# Patient Record
Sex: MALE | Race: OTHER | Employment: PART TIME | URBAN - METROPOLITAN AREA
[De-identification: names, ages, dates, MRNs, and addresses within clinical notes are randomized per-mention and may not be internally consistent; named-entity substitution may affect disease eponyms.]

---
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
## 2018-01-15 NOTE — TELEPHONE ENCOUNTER
Pt states lost printed script for this month for med below and he would like a replacement and states out of med and can he please contact Pt when ready for .      •  amphetamine-dextroamphetamine (ADDERALL) 20 MG Oral Tab, Take 20 mg by mouth 2 (two

## 2018-01-16 RX ORDER — DEXTROAMPHETAMINE SACCHARATE, AMPHETAMINE ASPARTATE, DEXTROAMPHETAMINE SULFATE AND AMPHETAMINE SULFATE 5; 5; 5; 5 MG/1; MG/1; MG/1; MG/1
20 TABLET ORAL 2 TIMES DAILY
Refills: 0 | Status: CANCELLED | OUTPATIENT
Start: 2018-01-16

## 2018-01-16 RX ORDER — DEXTROAMPHETAMINE SACCHARATE, AMPHETAMINE ASPARTATE, DEXTROAMPHETAMINE SULFATE AND AMPHETAMINE SULFATE 5; 5; 5; 5 MG/1; MG/1; MG/1; MG/1
20 TABLET ORAL 2 TIMES DAILY
Qty: 60 TABLET | Refills: 0 | Status: SHIPPED | OUTPATIENT
Start: 2018-01-16 | End: 2018-02-13

## 2018-01-16 NOTE — TELEPHONE ENCOUNTER
Message noted: Chart reviewed and may refill medication as requested times one. Script printed and left at  here at [facility]. Please notify patient.

## 2018-02-13 RX ORDER — DEXTROAMPHETAMINE SACCHARATE, AMPHETAMINE ASPARTATE, DEXTROAMPHETAMINE SULFATE AND AMPHETAMINE SULFATE 5; 5; 5; 5 MG/1; MG/1; MG/1; MG/1
20 TABLET ORAL 2 TIMES DAILY
Qty: 60 TABLET | Refills: 0 | Status: SHIPPED | OUTPATIENT
Start: 2018-02-13 | End: 2018-03-13

## 2018-02-13 NOTE — TELEPHONE ENCOUNTER
Pt calling states he has spoken to his pharmacy and they refuse to send the request for his refill, pt states they state he must call the office. Pt requesting refill for adderall.        Current Outpatient Prescriptions:  amphetamine-dextroamphetamine (ADD

## 2018-02-16 ENCOUNTER — OFFICE VISIT (OUTPATIENT)
Dept: INTERNAL MEDICINE CLINIC | Facility: CLINIC | Age: 18
End: 2018-02-16

## 2018-02-16 ENCOUNTER — TELEPHONE (OUTPATIENT)
Dept: INTERNAL MEDICINE CLINIC | Facility: CLINIC | Age: 18
End: 2018-02-16

## 2018-02-16 VITALS
BODY MASS INDEX: 25.91 KG/M2 | HEIGHT: 70 IN | WEIGHT: 181 LBS | SYSTOLIC BLOOD PRESSURE: 127 MMHG | DIASTOLIC BLOOD PRESSURE: 75 MMHG | TEMPERATURE: 98 F | HEART RATE: 71 BPM

## 2018-02-16 DIAGNOSIS — R30.9 PAIN WITH URINATION: Primary | ICD-10-CM

## 2018-02-16 DIAGNOSIS — R30.0 DYSURIA: ICD-10-CM

## 2018-02-16 LAB
APPEARANCE: CLEAR
BILIRUBIN: NEGATIVE
GLUCOSE (URINE DIPSTICK): NEGATIVE MG/DL
KETONES (URINE DIPSTICK): NEGATIVE MG/DL
MULTISTIX LOT#: NORMAL NUMERIC
NITRITE, URINE: NEGATIVE
OCCULT BLOOD: NEGATIVE
PH, URINE: 6 (ref 4.5–8)
PROTEIN (URINE DIPSTICK): 6 MG/DL
SPECIFIC GRAVITY: 1 (ref 1–1.03)
URINE-COLOR: YELLOW
UROBILINOGEN,SEMI-QN: NEGATIVE MG/DL (ref 0–1.9)

## 2018-02-16 PROCEDURE — 99212 OFFICE O/P EST SF 10 MIN: CPT | Performed by: INTERNAL MEDICINE

## 2018-02-16 PROCEDURE — 99203 OFFICE O/P NEW LOW 30 MIN: CPT | Performed by: INTERNAL MEDICINE

## 2018-02-16 PROCEDURE — 81002 URINALYSIS NONAUTO W/O SCOPE: CPT | Performed by: INTERNAL MEDICINE

## 2018-02-16 NOTE — PROGRESS NOTES
Patient ID: Shahrzad Olmos is a 21year old male. Patient presents with:  Urinary Symptoms (urologic): x4 days wonders if it is related to Chronic Granulomatous Disease. [facility] Diagnosed him, the ID department.        HISTORY OF EN , Rfl:   •  itraconazole (SPORANOX) 100 MG Oral Cap, Take  by mouth., Disp: , Rfl:   •  amphetamine-dextroamphetamine (ADDERALL) 20 MG Oral Tab, Take 1 tablet (20 mg total) by mouth 2 (two) times daily. , Disp: 60 tablet, Rfl: 0  •  mercaptopurine 50 MG Ora negative   BILIRUBIN Latest Ref Range: Negative  negative   KETONES (URINE DIPSTICK) Latest Ref Range: Negative mg/dL negative   OCCULT BLOOD Latest Ref Range: Negative  negative   PH, URINE Latest Ref Range: 4.5 - 8.0  6.0   PROTEIN (URINE DIPSTICK) Lates

## 2018-02-16 NOTE — TELEPHONE ENCOUNTER
Richard Barnett from [facility] called in as an FYI to let Dr. Bonifacio Odell know that they will be prescribing him a steroid taper and just wanted to make sure Dr. Bonifacio Odell didn't prescribe anything. Richard Barnett states pt will follow up with them next week as well.

## 2018-02-16 NOTE — PATIENT INSTRUCTIONS
Dysuria     Painful urination (dysuria) is often caused by a problem in the urinary tract. Dysuria is pain felt during urination. It is often described as a burning. Learn more about this problem and how it can be treated. What causes dysuria?   Possib · Rash or joint pain  · Increased back or abdominal pain  · Enlarged painful lymph nodes (lumps) in the groin   Date Last Reviewed: 1/1/2017  © 8930-8899 [employer] [address].  [address] 1  n

## 2018-03-07 ENCOUNTER — LAB ENCOUNTER (OUTPATIENT)
Dept: LAB | Age: 18
End: 2018-03-07
Attending: INTERNAL MEDICINE
Payer: COMMERCIAL

## 2018-03-07 ENCOUNTER — NURSE TRIAGE (OUTPATIENT)
Dept: INTERNAL MEDICINE CLINIC | Facility: CLINIC | Age: 18
End: 2018-03-07

## 2018-03-07 ENCOUNTER — OFFICE VISIT (OUTPATIENT)
Dept: INTERNAL MEDICINE CLINIC | Facility: CLINIC | Age: 18
End: 2018-03-07

## 2018-03-07 VITALS
HEIGHT: 70 IN | WEIGHT: 178 LBS | DIASTOLIC BLOOD PRESSURE: 72 MMHG | BODY MASS INDEX: 25.48 KG/M2 | SYSTOLIC BLOOD PRESSURE: 124 MMHG | HEART RATE: 79 BPM

## 2018-03-07 DIAGNOSIS — N50.82 SCROTAL PAIN: ICD-10-CM

## 2018-03-07 DIAGNOSIS — N50.82 SCROTAL PAIN: Primary | ICD-10-CM

## 2018-03-07 LAB
BASOPHILS # BLD: 0.1 K/UL (ref 0–0.2)
BASOPHILS NFR BLD: 1 %
BILIRUB UR QL: NEGATIVE
CLARITY UR: CLEAR
COLOR UR: YELLOW
EOSINOPHIL # BLD: 0.2 K/UL (ref 0–0.7)
EOSINOPHIL NFR BLD: 4 %
ERYTHROCYTE [DISTWIDTH] IN BLOOD BY AUTOMATED COUNT: 14.1 % (ref 11–15)
GLUCOSE UR-MCNC: NEGATIVE MG/DL
HCT VFR BLD AUTO: 40.7 % (ref 41–52)
HGB BLD-MCNC: 13.9 G/DL (ref 13.5–17.5)
HGB UR QL STRIP.AUTO: NEGATIVE
KETONES UR-MCNC: NEGATIVE MG/DL
LEUKOCYTE ESTERASE UR QL STRIP.AUTO: NEGATIVE
LYMPHOCYTES # BLD: 1.5 K/UL (ref 1–4)
LYMPHOCYTES NFR BLD: 26 %
MCH RBC QN AUTO: 29.5 PG (ref 27–32)
MCHC RBC AUTO-ENTMCNC: 34 G/DL (ref 32–37)
MCV RBC AUTO: 86.6 FL (ref 80–100)
MONOCYTES # BLD: 0.7 K/UL (ref 0–1)
MONOCYTES NFR BLD: 13 %
NEUTROPHILS # BLD AUTO: 3.2 K/UL (ref 1.8–7.7)
NEUTROPHILS NFR BLD: 56 %
NITRITE UR QL STRIP.AUTO: NEGATIVE
PH UR: 6 [PH] (ref 5–8)
PLATELET # BLD AUTO: 201 K/UL (ref 140–400)
PMV BLD AUTO: 8.6 FL (ref 7.4–10.3)
PROT UR-MCNC: NEGATIVE MG/DL
RBC # BLD AUTO: 4.7 M/UL (ref 4.5–5.9)
SP GR UR STRIP: 1.02 (ref 1–1.03)
UROBILINOGEN UR STRIP-ACNC: <2
VIT C UR-MCNC: NEGATIVE MG/DL
WBC # BLD AUTO: 5.7 K/UL (ref 4–11)

## 2018-03-07 PROCEDURE — 86694 HERPES SIMPLEX NES ANTBDY: CPT

## 2018-03-07 PROCEDURE — 87340 HEPATITIS B SURFACE AG IA: CPT

## 2018-03-07 PROCEDURE — 99213 OFFICE O/P EST LOW 20 MIN: CPT | Performed by: INTERNAL MEDICINE

## 2018-03-07 PROCEDURE — 36415 COLL VENOUS BLD VENIPUNCTURE: CPT

## 2018-03-07 PROCEDURE — 86780 TREPONEMA PALLIDUM: CPT

## 2018-03-07 PROCEDURE — 87591 N.GONORRHOEAE DNA AMP PROB: CPT

## 2018-03-07 PROCEDURE — 99212 OFFICE O/P EST SF 10 MIN: CPT | Performed by: INTERNAL MEDICINE

## 2018-03-07 PROCEDURE — 81003 URINALYSIS AUTO W/O SCOPE: CPT

## 2018-03-07 PROCEDURE — 87389 HIV-1 AG W/HIV-1&-2 AB AG IA: CPT

## 2018-03-07 PROCEDURE — 85025 COMPLETE CBC W/AUTO DIFF WBC: CPT

## 2018-03-07 PROCEDURE — 87491 CHLMYD TRACH DNA AMP PROBE: CPT

## 2018-03-07 PROCEDURE — 86803 HEPATITIS C AB TEST: CPT

## 2018-03-07 RX ORDER — TRETINOIN 0.025 %
CREAM (GRAM) TOPICAL
COMMUNITY
Start: 2016-07-25 | End: 2019-09-11

## 2018-03-07 RX ORDER — CLINDAMYCIN PHOSPHATE 10 MG/ML
LOTION TOPICAL
COMMUNITY
Start: 2016-07-25 | End: 2018-03-07

## 2018-03-07 RX ORDER — SULFAMETHOXAZOLE AND TRIMETHOPRIM 400; 80 MG/1; MG/1
TABLET ORAL
COMMUNITY
Start: 2016-07-25 | End: 2018-03-07

## 2018-03-07 NOTE — TELEPHONE ENCOUNTER
Action Requested: Summary for Provider     []  Critical Lab, Recommendations Needed  [] Need Additional Advice  []   FYI    []   Need Orders  [] Need Medications Sent to Pharmacy  []  Other     SUMMARY: Scheduled today with MA at [facility] at 1:40 pm, per pt pre

## 2018-03-07 NOTE — PROGRESS NOTES
Xavi Roger is a 21year old male.   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

## 2018-03-08 LAB
C TRACH DNA SPEC QL NAA+PROBE: NEGATIVE
HBV SURFACE AG SERPL QL IA: NONREACTIVE
HCV AB SERPL QL IA: NONREACTIVE
HIV1+2 AB SERPL QL IA: NONREACTIVE
N GONORRHOEA DNA SPEC QL NAA+PROBE: NEGATIVE

## 2018-03-09 LAB — T PALLIDUM AB SER QL: NEGATIVE

## 2018-03-10 LAB
HSV TYPE 1/2 COMBINED AB, IGG: 1 IV
HSV TYPE 1/2 COMBINED ABS, IGM: 0.36 IV

## 2018-03-13 RX ORDER — DEXTROAMPHETAMINE SACCHARATE, AMPHETAMINE ASPARTATE, DEXTROAMPHETAMINE SULFATE AND AMPHETAMINE SULFATE 5; 5; 5; 5 MG/1; MG/1; MG/1; MG/1
20 TABLET ORAL 2 TIMES DAILY
Qty: 60 TABLET | Refills: 0 | Status: SHIPPED | OUTPATIENT
Start: 2018-03-16 | End: 2018-04-15

## 2018-03-13 RX ORDER — DEXTROAMPHETAMINE SACCHARATE, AMPHETAMINE ASPARTATE, DEXTROAMPHETAMINE SULFATE AND AMPHETAMINE SULFATE 5; 5; 5; 5 MG/1; MG/1; MG/1; MG/1
20 TABLET ORAL 2 TIMES DAILY
Qty: 60 TABLET | Refills: 0 | Status: SHIPPED | OUTPATIENT
Start: 2018-05-17 | End: 2018-06-18

## 2018-03-13 RX ORDER — DEXTROAMPHETAMINE SACCHARATE, AMPHETAMINE ASPARTATE, DEXTROAMPHETAMINE SULFATE AND AMPHETAMINE SULFATE 5; 5; 5; 5 MG/1; MG/1; MG/1; MG/1
20 TABLET ORAL 2 TIMES DAILY
Qty: 60 TABLET | Refills: 0 | Status: SHIPPED | OUTPATIENT
Start: 2018-04-16 | End: 2018-05-16

## 2018-03-13 NOTE — TELEPHONE ENCOUNTER
Message noted: Chart reviewed and may refill medication as requested times 3. Script printed and left at  here at [facility]. Please notify patient.

## 2018-03-13 NOTE — TELEPHONE ENCOUNTER
Pt stts he would like a refill on  Rx Adderall 20 MG. Pt stts he is out of medication.  Pt stts his pharmacy is ok with it being faxed over to [facility].             Current Outpatient Prescriptions:  amphetamine-dextroamphetamine (ADDERALL) 20 MG Oral Tab Take

## 2018-05-16 RX ORDER — DEXTROAMPHETAMINE SACCHARATE, AMPHETAMINE ASPARTATE, DEXTROAMPHETAMINE SULFATE AND AMPHETAMINE SULFATE 5; 5; 5; 5 MG/1; MG/1; MG/1; MG/1
20 TABLET ORAL 2 TIMES DAILY
Qty: 60 TABLET | Refills: 0 | OUTPATIENT
Start: 2018-05-16 | End: 2018-06-15

## 2018-05-16 NOTE — TELEPHONE ENCOUNTER
Pt is requesting refill       Please advise       Current Outpatient Prescriptions:  [START ON 5/17/2018] amphetamine-dextroamphetamine (ADDERALL) 20 MG Oral Tab Take 1 tablet (20 mg total) by mouth 2 (two) times daily.  Disp: 60 tablet Rfl: 0

## 2018-05-18 NOTE — TELEPHONE ENCOUNTER
Spoke with patient and states he needs Adderall script--in the middle of finals and needs it to study.     Relayed to patient he should have script already given to him for this month--patient verbalizes understanding and agreement and will look for script

## 2018-06-18 RX ORDER — DEXTROAMPHETAMINE SACCHARATE, AMPHETAMINE ASPARTATE, DEXTROAMPHETAMINE SULFATE AND AMPHETAMINE SULFATE 5; 5; 5; 5 MG/1; MG/1; MG/1; MG/1
20 TABLET ORAL 2 TIMES DAILY
Qty: 60 TABLET | Refills: 0 | Status: SHIPPED | OUTPATIENT
Start: 2018-06-18 | End: 2018-10-30

## 2018-06-18 NOTE — TELEPHONE ENCOUNTER
Please advise.    Last refilled on 5/17/18    Please respond to pool: EM [facility] LPN/CMA    Refill Protocol Appointment Criteria  · Appointment scheduled in the past 6 months or in the next 3 months  Recent Outpatient Visits            3 months ago Scrotal p

## 2018-06-18 NOTE — TELEPHONE ENCOUNTER
Pt is requesting a 3 month supply. amphetamine-dextroamphetamine (ADDERALL) 20 MG Oral Tab Take 1 tablet (20 mg total) by mouth 2 (two) times daily.  Disp: 60 tablet Rfl: 0

## 2018-06-19 NOTE — TELEPHONE ENCOUNTER
Patient calling to check status of request and verify if this has been printed.      Patient requesting to  medication before 4 pm. Today

## 2018-06-24 ENCOUNTER — APPOINTMENT (OUTPATIENT)
Dept: GENERAL RADIOLOGY | Facility: HOSPITAL | Age: 18
End: 2018-06-24
Attending: EMERGENCY MEDICINE
Payer: COMMERCIAL

## 2018-06-24 ENCOUNTER — HOSPITAL ENCOUNTER (EMERGENCY)
Facility: HOSPITAL | Age: 18
Discharge: HOME | End: 2018-06-24
Attending: EMERGENCY MEDICINE
Payer: COMMERCIAL

## 2018-06-24 VITALS
DIASTOLIC BLOOD PRESSURE: 79 MMHG | TEMPERATURE: 98 F | WEIGHT: 170 LBS | HEART RATE: 72 BPM | BODY MASS INDEX: 24.34 KG/M2 | HEIGHT: 70 IN | RESPIRATION RATE: 18 BRPM | OXYGEN SATURATION: 99 % | SYSTOLIC BLOOD PRESSURE: 118 MMHG

## 2018-06-24 DIAGNOSIS — S39.012A LUMBAR STRAIN, INITIAL ENCOUNTER: Primary | ICD-10-CM

## 2018-06-24 PROCEDURE — 72100 X-RAY EXAM L-S SPINE 2/3 VWS: CPT | Performed by: EMERGENCY MEDICINE

## 2018-06-24 PROCEDURE — 99283 EMERGENCY DEPT VISIT LOW MDM: CPT

## 2018-06-24 RX ORDER — METHYLPREDNISOLONE 4 MG/1
TABLET ORAL
Qty: 1 PACKAGE | Refills: 0 | Status: SHIPPED | OUTPATIENT
Start: 2018-06-24 | End: 2018-06-29

## 2018-06-24 RX ORDER — CYCLOBENZAPRINE HCL 10 MG
10 TABLET ORAL 3 TIMES DAILY PRN
Qty: 20 TABLET | Refills: 0 | Status: SHIPPED | OUTPATIENT
Start: 2018-06-24 | End: 2018-07-01

## 2018-06-24 NOTE — ED INITIAL ASSESSMENT (HPI)
Pt reports lower back pain while lifting weights yesterday at noon. Pt reports that pain worsens with exertion and flexion of his neck.  Pt reports that pain is worse on the left lower lumbar region

## 2018-06-24 NOTE — ED PROVIDER NOTES
Patient Seen in: [facility] Emergency Department    History   Patient presents with:  Back Pain (musculoskeletal)    Stated Complaint: back pain from lifting weights     HPI    Patient is a 49-year-old male who was lifting weights at the gym yeste Lumbar back: He exhibits decreased range of motion and spasm (Lumbar paraspinal). He exhibits no tenderness. Back:    Neurological: He is alert and oriented to person, place, and time. He has normal strength and normal reflexes.  No sensory defi

## 2018-06-27 ENCOUNTER — OFFICE VISIT (OUTPATIENT)
Dept: INTERNAL MEDICINE CLINIC | Facility: CLINIC | Age: 18
End: 2018-06-27

## 2018-06-27 VITALS
SYSTOLIC BLOOD PRESSURE: 138 MMHG | BODY MASS INDEX: 24.48 KG/M2 | WEIGHT: 171 LBS | HEART RATE: 83 BPM | HEIGHT: 70 IN | DIASTOLIC BLOOD PRESSURE: 83 MMHG

## 2018-06-27 DIAGNOSIS — S39.012D STRAIN OF LUMBAR REGION, SUBSEQUENT ENCOUNTER: Primary | ICD-10-CM

## 2018-06-27 PROCEDURE — 99212 OFFICE O/P EST SF 10 MIN: CPT | Performed by: INTERNAL MEDICINE

## 2018-06-27 PROCEDURE — 99213 OFFICE O/P EST LOW 20 MIN: CPT | Performed by: INTERNAL MEDICINE

## 2018-06-27 RX ORDER — TRAMADOL HYDROCHLORIDE 50 MG/1
50 TABLET ORAL DAILY PRN
Qty: 10 TABLET | Refills: 0 | Status: SHIPPED | OUTPATIENT
Start: 2018-06-27 | End: 2018-07-05

## 2018-06-27 NOTE — PROGRESS NOTES
Bong Lee is a 21year old male.   Patient presents with:  ER F/U: lumbar strain      HPI:   Pt comes as an urgent visit   C/c back pain   C/o lumbar stain   Woke up with lower back pain 7/10 and the neck day 9/10 and had to go to ER --was given cyclobe No fevers, chills, sweats, fatigue  SKIN: denies any unusual skin lesions or rashes  GI: denies abdominal pain and denies heartburn, nausea or vomiting  : No Pain on urination, change in the color of urine, discharge, urinating frequently, urgency or hes

## 2018-06-30 ENCOUNTER — APPOINTMENT (OUTPATIENT)
Dept: CT IMAGING | Facility: HOSPITAL | Age: 18
End: 2018-06-30
Attending: EMERGENCY MEDICINE
Payer: COMMERCIAL

## 2018-06-30 ENCOUNTER — HOSPITAL ENCOUNTER (EMERGENCY)
Facility: HOSPITAL | Age: 18
Discharge: HOME | End: 2018-06-30
Attending: EMERGENCY MEDICINE
Payer: COMMERCIAL

## 2018-06-30 VITALS
HEIGHT: 70 IN | HEART RATE: 89 BPM | BODY MASS INDEX: 24.34 KG/M2 | RESPIRATION RATE: 16 BRPM | SYSTOLIC BLOOD PRESSURE: 118 MMHG | DIASTOLIC BLOOD PRESSURE: 98 MMHG | TEMPERATURE: 99 F | OXYGEN SATURATION: 98 % | WEIGHT: 170 LBS

## 2018-06-30 DIAGNOSIS — G44.209 TENSION HEADACHE: ICD-10-CM

## 2018-06-30 DIAGNOSIS — M62.830 SPASM OF MUSCLE OF LOWER BACK: Primary | ICD-10-CM

## 2018-06-30 LAB
ANION GAP SERPL CALC-SCNC: 7 MMOL/L (ref 0–18)
BUN SERPL-MCNC: 14 MG/DL (ref 8–20)
BUN/CREAT SERPL: 15.6 (ref 10–20)
CALCIUM SERPL-MCNC: 9.4 MG/DL (ref 8.5–10.5)
CHLORIDE SERPL-SCNC: 101 MMOL/L (ref 95–110)
CO2 SERPL-SCNC: 28 MMOL/L (ref 22–32)
CREAT SERPL-MCNC: 0.9 MG/DL (ref 0.5–1.5)
GLUCOSE SERPL-MCNC: 106 MG/DL (ref 70–99)
MAGNESIUM SERPL-MCNC: 2 MG/DL (ref 1.8–2.5)
OSMOLALITY UR CALC.SUM OF ELEC: 283 MOSM/KG (ref 275–295)
POTASSIUM SERPL-SCNC: 4.6 MMOL/L (ref 3.3–5.1)
SODIUM SERPL-SCNC: 136 MMOL/L (ref 136–144)

## 2018-06-30 PROCEDURE — 96375 TX/PRO/DX INJ NEW DRUG ADDON: CPT

## 2018-06-30 PROCEDURE — 99284 EMERGENCY DEPT VISIT MOD MDM: CPT

## 2018-06-30 PROCEDURE — 83735 ASSAY OF MAGNESIUM: CPT | Performed by: EMERGENCY MEDICINE

## 2018-06-30 PROCEDURE — 80048 BASIC METABOLIC PNL TOTAL CA: CPT | Performed by: EMERGENCY MEDICINE

## 2018-06-30 PROCEDURE — 70450 CT HEAD/BRAIN W/O DYE: CPT | Performed by: EMERGENCY MEDICINE

## 2018-06-30 PROCEDURE — 96374 THER/PROPH/DIAG INJ IV PUSH: CPT

## 2018-06-30 PROCEDURE — 96361 HYDRATE IV INFUSION ADD-ON: CPT

## 2018-06-30 RX ORDER — IBUPROFEN 600 MG/1
600 TABLET ORAL EVERY 8 HOURS PRN
Qty: 30 TABLET | Refills: 0 | Status: SHIPPED | OUTPATIENT
Start: 2018-06-30 | End: 2018-07-07

## 2018-06-30 RX ORDER — LIDOCAINE 50 MG/G
2 PATCH TOPICAL EVERY 24 HOURS
Qty: 15 PATCH | Refills: 0 | Status: SHIPPED | OUTPATIENT
Start: 2018-06-30 | End: 2018-10-31

## 2018-06-30 RX ORDER — LIDOCAINE 50 MG/G
2 PATCH TOPICAL ONCE
Status: DISCONTINUED | OUTPATIENT
Start: 2018-06-30 | End: 2018-06-30

## 2018-06-30 RX ORDER — DEXAMETHASONE SODIUM PHOSPHATE 4 MG/ML
10 VIAL (ML) INJECTION ONCE
Status: COMPLETED | OUTPATIENT
Start: 2018-06-30 | End: 2018-06-30

## 2018-06-30 RX ORDER — ORPHENADRINE CITRATE 100 MG/1
100 TABLET, EXTENDED RELEASE ORAL 2 TIMES DAILY
Qty: 20 TABLET | Refills: 0 | Status: SHIPPED | OUTPATIENT
Start: 2018-06-30 | End: 2018-07-07

## 2018-06-30 RX ORDER — KETOROLAC TROMETHAMINE 30 MG/ML
15 INJECTION, SOLUTION INTRAMUSCULAR; INTRAVENOUS ONCE
Status: COMPLETED | OUTPATIENT
Start: 2018-06-30 | End: 2018-06-30

## 2018-06-30 RX ORDER — ORPHENADRINE CITRATE 30 MG/ML
30 INJECTION INTRAMUSCULAR; INTRAVENOUS EVERY 12 HOURS
Status: DISCONTINUED | OUTPATIENT
Start: 2018-06-30 | End: 2018-06-30

## 2018-06-30 NOTE — ED PROVIDER NOTES
Patient Seen in: [facility] Emergency Department    History   Patient presents with:  Back Pain (musculoskeletal)    Stated Complaint: back pain    HPI    59-year-old male presents for complaint of lower back pain and headache.   Patient reports th Genitourinary: Negative. Musculoskeletal: Positive for back pain. Skin: Negative. Neurological: Positive for headaches. All other systems reviewed and are negative. Positive for stated complaint: back pain  Other systems are as noted in HPI. Neurological: He is alert and oriented to person, place, and time. He has normal strength. No cranial nerve deficit or sensory deficit. Coordination and gait normal.   Reflex Scores:       Patellar reflexes are 2+ on the right side and 2+ on the left side. PROCEDURE: CT BRAIN OR HEAD (CPT=70450)  COMPARISON: None. INDICATIONS: Persistent headache. Recent treatment for low back strain including anti-inflammatory drugs, muscle relaxants and steroids.   TECHNIQUE: CT images were obtained without contrast [facility] Medications Prescribed:  Current Discharge Medication List    START taking these medications    Orphenadrine Citrate  MG Oral Tablet 12 Hr  Take 100 mg by mouth 2 (two) times daily.   Qty: 20 tablet Refills: 0    ibuprofen 600 MG Oral Tab  Take 1 tabl

## 2018-06-30 NOTE — ED NOTES
Pt lifted weights a week ago and pulled his back. Pt seen in ed last Sunday. Followed up with md on Tuesday. Finished steroid dose pack and is taking tramadol for pain. Finished flexeril.  Pt states when he gets spasms his head feels like there is a band ar

## 2018-07-05 ENCOUNTER — OFFICE VISIT (OUTPATIENT)
Dept: INTERNAL MEDICINE CLINIC | Facility: CLINIC | Age: 18
End: 2018-07-05

## 2018-07-05 VITALS
SYSTOLIC BLOOD PRESSURE: 125 MMHG | WEIGHT: 171 LBS | DIASTOLIC BLOOD PRESSURE: 80 MMHG | HEART RATE: 98 BPM | BODY MASS INDEX: 24.48 KG/M2 | HEIGHT: 70 IN

## 2018-07-05 DIAGNOSIS — S39.012D STRAIN OF LUMBAR REGION, SUBSEQUENT ENCOUNTER: Primary | ICD-10-CM

## 2018-07-05 PROCEDURE — 99213 OFFICE O/P EST LOW 20 MIN: CPT | Performed by: INTERNAL MEDICINE

## 2018-07-05 PROCEDURE — 99212 OFFICE O/P EST SF 10 MIN: CPT | Performed by: INTERNAL MEDICINE

## 2018-07-05 RX ORDER — TRAMADOL HYDROCHLORIDE 50 MG/1
50 TABLET ORAL 2 TIMES DAILY PRN
Qty: 20 TABLET | Refills: 0 | Status: SHIPPED | OUTPATIENT
Start: 2018-07-05 | End: 2018-10-31

## 2018-07-05 NOTE — PROGRESS NOTES
Aloma Patch is a 21year old male.   Patient presents with:  Back Pain: better, starting PT today      HPI:   Pt comes for f/u  C/c back pain   C/o back pain is better   In the am will put the lidoderm patch and then ibuprofen and will do fine but by 10- Disp:  Rfl: 2   itraconazole (SPORANOX) 100 MG Oral Cap Take  by mouth. Disp:  Rfl:    tretinoin 0.025 % External Cream Apply topically.  Disp:  Rfl:       Past Medical History:   Diagnosis Date   • Chronic granulomatous disease (CGD) of childhood ([facility] [address] patient indicates understanding of these issues and agrees to the plan. No Follow-up on file.

## 2018-07-05 NOTE — PATIENT INSTRUCTIONS
Understanding Lumbosacral Strain    Lumbosacral strain is a medical term for an injury that causes low back pain. The lumbosacral area (low back) is between the bottom of the ribcage and the top of the buttocks.  A strain is tearing of muscles and tendons · Injections of medicine. This may relieve symptoms. If these treatments do not relieve symptoms, your healthcare provider may order imaging tests to learn more about the problem. Sometimes you may need surgery.   Possible complications of lumbosacral stra

## 2018-07-10 ENCOUNTER — TELEPHONE (OUTPATIENT)
Dept: INTERNAL MEDICINE CLINIC | Facility: CLINIC | Age: 18
End: 2018-07-10

## 2018-07-10 NOTE — TELEPHONE ENCOUNTER
Pt was seen by  last week and pt wants to know if Dr can write him a note for him to return back to work 7/11      Please advise

## 2018-07-10 NOTE — TELEPHONE ENCOUNTER
This is fine–please give him a letter to return to work-- pls call him and make sure when he went back - today or yest -ty

## 2018-07-20 ENCOUNTER — TELEPHONE (OUTPATIENT)
Dept: ADMINISTRATIVE | Age: 18
End: 2018-07-20

## 2018-07-20 NOTE — TELEPHONE ENCOUNTER
[employer] Disability form received in [facility] via dept. Logged for processing.  NK

## 2018-07-23 ENCOUNTER — TELEPHONE (OUTPATIENT)
Dept: INTERNAL MEDICINE CLINIC | Facility: CLINIC | Age: 18
End: 2018-07-23

## 2018-07-23 NOTE — TELEPHONE ENCOUNTER
LR 6/18/18    LOV 7/05/18 with MA    90 day panel pended for Adderall 20 BID for script printed    Please respond to pool: EM [facility] LPN/CMA    Office staff, please call patient when script ready for .

## 2018-07-23 NOTE — TELEPHONE ENCOUNTER
Pt is requesting refill for controlled substance. Please advise      Current Outpatient Prescriptions:              amphetamine-dextroamphetamine (ADDERALL) 20 MG Oral Tab Take 1 tablet (20 mg total) by mouth 2 (two) times daily.  Disp: 60 tablet Rfl: 0

## 2018-07-23 NOTE — TELEPHONE ENCOUNTER
Pt would like to drop off disability forms today. Pt states that he needs those forms completed before Friday.  Please advise

## 2018-07-24 RX ORDER — DEXTROAMPHETAMINE SACCHARATE, AMPHETAMINE ASPARTATE, DEXTROAMPHETAMINE SULFATE AND AMPHETAMINE SULFATE 5; 5; 5; 5 MG/1; MG/1; MG/1; MG/1
20 TABLET ORAL 2 TIMES DAILY
Qty: 60 TABLET | Refills: 0 | Status: SHIPPED | OUTPATIENT
Start: 2018-09-21 | End: 2018-08-28

## 2018-07-24 RX ORDER — DEXTROAMPHETAMINE SACCHARATE, AMPHETAMINE ASPARTATE, DEXTROAMPHETAMINE SULFATE AND AMPHETAMINE SULFATE 5; 5; 5; 5 MG/1; MG/1; MG/1; MG/1
20 TABLET ORAL 2 TIMES DAILY
Qty: 60 TABLET | Refills: 0 | Status: SHIPPED | OUTPATIENT
Start: 2018-07-24 | End: 2018-08-23

## 2018-07-24 RX ORDER — DEXTROAMPHETAMINE SACCHARATE, AMPHETAMINE ASPARTATE, DEXTROAMPHETAMINE SULFATE AND AMPHETAMINE SULFATE 5; 5; 5; 5 MG/1; MG/1; MG/1; MG/1
20 TABLET ORAL 2 TIMES DAILY
Qty: 60 TABLET | Refills: 0 | Status: SHIPPED | OUTPATIENT
Start: 2018-08-22 | End: 2018-08-28

## 2018-07-24 NOTE — TELEPHONE ENCOUNTER
Dr. Renae Erps,    I have attached two forms that require sign off. One FMLA and other Disab. Dr. Masha Chavez saw him for back pain and supported time off. I am hoping you could sign off since he needs paperwork by Friday. Please sign off on form:  -Highlight the patient and hit \"Chart\" button. -In Chart Review, w/in the Encounter tab - click 1 time on the Telephone call encounter for 7/20/2018. Scroll down the telephone encounter.  -Click \"scan on\" blue Hyperlink under \"Media\" heading for PPD Disab and FMLA 7/20/2018 w/in the telephone enc.  -Click on Acknowledge button at the bottom right corner and left-click onto image, signature stamp appears and drag signature to Provider signature line. Stamp will turn blue. Close window.     Thank you,    Carmela Bender.

## 2018-07-24 NOTE — TELEPHONE ENCOUNTER
Dropped off by pt @[facility] - [employer] FMLA + Disability forms+ FCR+ Signed release, aware billed for $50 (2 forms). Logged for processing.  NK

## 2018-07-25 NOTE — TELEPHONE ENCOUNTER
Completed forms faxed to [facility] and originals mailed to patient.  SC

## 2018-08-28 ENCOUNTER — TELEPHONE (OUTPATIENT)
Dept: INTERNAL MEDICINE CLINIC | Facility: CLINIC | Age: 18
End: 2018-08-28

## 2018-08-28 RX ORDER — DEXTROAMPHETAMINE SACCHARATE, AMPHETAMINE ASPARTATE, DEXTROAMPHETAMINE SULFATE AND AMPHETAMINE SULFATE 5; 5; 5; 5 MG/1; MG/1; MG/1; MG/1
20 TABLET ORAL 2 TIMES DAILY
Qty: 60 TABLET | Refills: 0 | Status: SHIPPED | OUTPATIENT
Start: 2018-09-21 | End: 2018-10-21

## 2018-08-28 RX ORDER — DEXTROAMPHETAMINE SACCHARATE, AMPHETAMINE ASPARTATE, DEXTROAMPHETAMINE SULFATE AND AMPHETAMINE SULFATE 5; 5; 5; 5 MG/1; MG/1; MG/1; MG/1
20 TABLET ORAL 2 TIMES DAILY
Qty: 60 TABLET | Refills: 0 | Status: SHIPPED | OUTPATIENT
Start: 2018-08-28 | End: 2018-09-27

## 2018-08-28 NOTE — TELEPHONE ENCOUNTER
Pt called back, states that his dad picked up the prescriptions, he will call to ask him where he placed the other 2 scripts.

## 2018-08-28 NOTE — TELEPHONE ENCOUNTER
Pt needs a script for    Current Outpatient Prescriptions:  amphetamine-dextroamphetamine (ADDERALL) 20 MG Oral Tab Take 1 tablet (20 mg total) by mouth 2 (two) times daily.  Disp: 60 tablet Rfl: 0                                               Per pt he is

## 2018-08-28 NOTE — TELEPHONE ENCOUNTER
Pt called back. States that his father reports that he only received one rx. He did not receive three rxs. He only received one rx on 7/24 and now he is out of meds. Father never picked up the Aug or Sept rxs.   Pt is asking that these rxs be rewritten

## 2018-09-24 NOTE — TELEPHONE ENCOUNTER
Patient states his mom only picked up August Adderall Rx--patient asking if there is another Rx for September available for  at [facility] office?     Office staff, please contact patient re:September script

## 2018-10-29 RX ORDER — DEXTROAMPHETAMINE SACCHARATE, AMPHETAMINE ASPARTATE, DEXTROAMPHETAMINE SULFATE AND AMPHETAMINE SULFATE 5; 5; 5; 5 MG/1; MG/1; MG/1; MG/1
20 TABLET ORAL 2 TIMES DAILY
Qty: 60 TABLET | Refills: 0 | OUTPATIENT
Start: 2018-10-29

## 2018-10-29 NOTE — TELEPHONE ENCOUNTER
Controlled medication pending for review. If approved needs to be called in or faxed by on-site staff.     Last Rx: 6/18  LOV: 11/13/17

## 2018-10-29 NOTE — TELEPHONE ENCOUNTER
Pt called in stating that he needs a refill on medication below. Pt states that he is completely out of medication. Pt states that it is a little difficult calling every month to get refills, asking if there is any way to do multiple months.      Please adv

## 2018-10-29 NOTE — TELEPHONE ENCOUNTER
[facility]  We need to find out who he is seeing. It looks like Dr. Leonor Christie has been seeing him for various conditions. Last seen for ADD on 11/13/17 by Dr. Becky Prince.

## 2018-10-30 RX ORDER — DEXTROAMPHETAMINE SACCHARATE, AMPHETAMINE ASPARTATE, DEXTROAMPHETAMINE SULFATE AND AMPHETAMINE SULFATE 5; 5; 5; 5 MG/1; MG/1; MG/1; MG/1
20 TABLET ORAL 2 TIMES DAILY
Qty: 60 TABLET | Refills: 0 | Status: SHIPPED | OUTPATIENT
Start: 2018-10-30 | End: 2019-03-27

## 2018-10-30 NOTE — TELEPHONE ENCOUNTER
Pt states that he wants this refill request to be sent to Dr. Radha Nicholson as he saw her most recently. I advised that she hadn't seen her for the condition which requires this medication, but pt wanted it sent to her. Please advise.

## 2018-10-30 NOTE — TELEPHONE ENCOUNTER
Controlled medication pending for review. If approved needs to be called in or faxed by on-site staff. Last Rx: 06/18/18.   LOV: 07/05/18

## 2018-10-31 NOTE — TELEPHONE ENCOUNTER
Please respond to pool: EM DAVE Escamilla 60 LPN/CMA--please notify patient, have patient   Patient has appt 10/31/18 with Dr Collins Beltran, he can  at appt today  [address] [facility] [address] - [facility], [address]  [phone]

## 2018-10-31 NOTE — PROGRESS NOTES
Mily Hilliard is a 19year old male.   Patient presents with:  ADHD      HPI:   Pt comes for f/u  C/c adhd   C/op adhd and was diag by dr Coreen Fonseca when he was in the 7th grade -- and dr Tila Krueger his pediatrician refilled it then dr Mann Mccullough once and dr Rebel Montgomery abdominal pain nausea or vomiting  MUS: No back pain, joint pain, muscle pain  NEURO: denies headaches , anxiety, depression    EXAM:   /78 (BP Location: Right arm, Patient Position: Sitting, Cuff Size: adult)   Pulse 81   Ht 5' 10\" (1.778 m)   Wt 1

## 2018-11-16 ENCOUNTER — OFFICE VISIT (OUTPATIENT)
Dept: INTERNAL MEDICINE CLINIC | Facility: CLINIC | Age: 18
End: 2018-11-16
Payer: COMMERCIAL

## 2018-11-16 VITALS
HEART RATE: 82 BPM | DIASTOLIC BLOOD PRESSURE: 72 MMHG | BODY MASS INDEX: 26.34 KG/M2 | HEIGHT: 70 IN | WEIGHT: 184 LBS | SYSTOLIC BLOOD PRESSURE: 118 MMHG

## 2018-11-16 DIAGNOSIS — K29.40 ATROPHIC GASTRITIS WITHOUT HEMORRHAGE: Primary | ICD-10-CM

## 2018-11-16 PROCEDURE — 99213 OFFICE O/P EST LOW 20 MIN: CPT | Performed by: INTERNAL MEDICINE

## 2018-11-16 PROCEDURE — 99212 OFFICE O/P EST SF 10 MIN: CPT | Performed by: INTERNAL MEDICINE

## 2018-11-16 RX ORDER — PANTOPRAZOLE SODIUM 40 MG/1
40 TABLET, DELAYED RELEASE ORAL
Qty: 30 TABLET | Refills: 3 | Status: SHIPPED | OUTPATIENT
Start: 2018-11-16 | End: 2019-05-02

## 2018-11-16 NOTE — PROGRESS NOTES
Krystyna Parker is a 19year old male.   Patient presents with:  Abdominal Pain: started 2 weeks ago, after a meal      HPI:   Pt comes as an urgent vist   C/c stomach pain  C/o stated he has been taking 2 pills of Advil liquid gels every day for 1 week  Star (CGD) of childhood ([facility]    • Vesico-ureteral reflux 12/05/2006      History reviewed. No pertinent surgical history. Social History:  Social History    Tobacco Use      Smoking status: Never Smoker      Smokeless tobacco: Never Used    Alcohol use:  No

## 2018-11-28 RX ORDER — DEXTROAMPHETAMINE SACCHARATE, AMPHETAMINE ASPARTATE, DEXTROAMPHETAMINE SULFATE AND AMPHETAMINE SULFATE 5; 5; 5; 5 MG/1; MG/1; MG/1; MG/1
20 TABLET ORAL 2 TIMES DAILY
Qty: 60 TABLET | Refills: 0 | Status: CANCELLED | OUTPATIENT
Start: 2018-11-28

## 2018-11-28 NOTE — TELEPHONE ENCOUNTER
Please call pt when script is ready for         amphetamine-dextroamphetamine (ADDERALL) 20 MG Oral Tab Take 1 tablet (20 mg total) by mouth 2 (two) times daily.  Disp: 60 tablet Rfl: 0

## 2018-11-29 NOTE — TELEPHONE ENCOUNTER
Controlled medication pending for review. If approved needs to be called in or faxed by on-site staff.     Last Rx: 10/30/18    LOV: 10/31/18 for ATTENTION DEFICIT DISORDER

## 2018-11-30 ENCOUNTER — TELEPHONE (OUTPATIENT)
Dept: INTERNAL MEDICINE CLINIC | Facility: CLINIC | Age: 18
End: 2018-11-30

## 2018-11-30 RX ORDER — DEXTROAMPHETAMINE SACCHARATE, AMPHETAMINE ASPARTATE, DEXTROAMPHETAMINE SULFATE AND AMPHETAMINE SULFATE 5; 5; 5; 5 MG/1; MG/1; MG/1; MG/1
20 TABLET ORAL 2 TIMES DAILY
Qty: 60 TABLET | Refills: 0 | Status: SHIPPED | OUTPATIENT
Start: 2019-01-29 | End: 2019-01-08

## 2018-11-30 RX ORDER — DEXTROAMPHETAMINE SACCHARATE, AMPHETAMINE ASPARTATE, DEXTROAMPHETAMINE SULFATE AND AMPHETAMINE SULFATE 5; 5; 5; 5 MG/1; MG/1; MG/1; MG/1
20 TABLET ORAL 2 TIMES DAILY
Qty: 60 TABLET | Refills: 0 | OUTPATIENT
Start: 2018-11-30 | End: 2018-12-30

## 2018-11-30 RX ORDER — DEXTROAMPHETAMINE SACCHARATE, AMPHETAMINE ASPARTATE, DEXTROAMPHETAMINE SULFATE AND AMPHETAMINE SULFATE 5; 5; 5; 5 MG/1; MG/1; MG/1; MG/1
20 TABLET ORAL 2 TIMES DAILY
Qty: 60 TABLET | Refills: 0 | Status: SHIPPED | OUTPATIENT
Start: 2018-12-30 | End: 2019-01-08

## 2018-11-30 RX ORDER — DEXTROAMPHETAMINE SACCHARATE, AMPHETAMINE ASPARTATE, DEXTROAMPHETAMINE SULFATE AND AMPHETAMINE SULFATE 5; 5; 5; 5 MG/1; MG/1; MG/1; MG/1
20 TABLET ORAL 2 TIMES DAILY
Qty: 60 TABLET | Refills: 0 | Status: SHIPPED | OUTPATIENT
Start: 2018-11-30 | End: 2018-12-30

## 2018-12-26 ENCOUNTER — APPOINTMENT (OUTPATIENT)
Dept: ULTRASOUND IMAGING | Facility: HOSPITAL | Age: 18
End: 2018-12-26
Attending: NURSE PRACTITIONER
Payer: COMMERCIAL

## 2018-12-26 ENCOUNTER — HOSPITAL ENCOUNTER (EMERGENCY)
Facility: HOSPITAL | Age: 18
Discharge: HOME | End: 2018-12-26
Payer: COMMERCIAL

## 2018-12-26 VITALS
WEIGHT: 175 LBS | DIASTOLIC BLOOD PRESSURE: 63 MMHG | HEART RATE: 66 BPM | SYSTOLIC BLOOD PRESSURE: 113 MMHG | RESPIRATION RATE: 18 BRPM | HEIGHT: 71 IN | BODY MASS INDEX: 24.5 KG/M2 | OXYGEN SATURATION: 98 % | TEMPERATURE: 98 F

## 2018-12-26 DIAGNOSIS — K29.70 GASTRITIS, PRESENCE OF BLEEDING UNSPECIFIED, UNSPECIFIED CHRONICITY, UNSPECIFIED GASTRITIS TYPE: Primary | ICD-10-CM

## 2018-12-26 PROCEDURE — 83690 ASSAY OF LIPASE: CPT | Performed by: NURSE PRACTITIONER

## 2018-12-26 PROCEDURE — 80048 BASIC METABOLIC PNL TOTAL CA: CPT

## 2018-12-26 PROCEDURE — 80076 HEPATIC FUNCTION PANEL: CPT | Performed by: NURSE PRACTITIONER

## 2018-12-26 PROCEDURE — C9113 INJ PANTOPRAZOLE SODIUM, VIA: HCPCS | Performed by: NURSE PRACTITIONER

## 2018-12-26 PROCEDURE — 96375 TX/PRO/DX INJ NEW DRUG ADDON: CPT

## 2018-12-26 PROCEDURE — A4216 STERILE WATER/SALINE, 10 ML: HCPCS | Performed by: NURSE PRACTITIONER

## 2018-12-26 PROCEDURE — 99284 EMERGENCY DEPT VISIT MOD MDM: CPT

## 2018-12-26 PROCEDURE — 81003 URINALYSIS AUTO W/O SCOPE: CPT

## 2018-12-26 PROCEDURE — 96374 THER/PROPH/DIAG INJ IV PUSH: CPT

## 2018-12-26 PROCEDURE — 76705 ECHO EXAM OF ABDOMEN: CPT | Performed by: NURSE PRACTITIONER

## 2018-12-26 PROCEDURE — 85025 COMPLETE CBC W/AUTO DIFF WBC: CPT

## 2018-12-26 PROCEDURE — 96361 HYDRATE IV INFUSION ADD-ON: CPT

## 2018-12-26 RX ORDER — SUCRALFATE ORAL 1 G/10ML
1 SUSPENSION ORAL
Qty: 200 ML | Refills: 0 | Status: SHIPPED | OUTPATIENT
Start: 2018-12-26 | End: 2018-12-31

## 2018-12-26 RX ORDER — ONDANSETRON 2 MG/ML
4 INJECTION INTRAMUSCULAR; INTRAVENOUS ONCE
Status: COMPLETED | OUTPATIENT
Start: 2018-12-26 | End: 2018-12-26

## 2018-12-26 RX ORDER — OMEPRAZOLE 40 MG/1
40 CAPSULE, DELAYED RELEASE ORAL DAILY
Qty: 30 CAPSULE | Refills: 0 | Status: SHIPPED | OUTPATIENT
Start: 2018-12-26 | End: 2019-01-25

## 2018-12-26 NOTE — ED NOTES
Discharge instructions reviewed with the patient and mom, all questions answered. Pt and mom verbalized understanding.

## 2018-12-26 NOTE — ED INITIAL ASSESSMENT (HPI)
Reports abd pain and \"sour stomach\" pain for past month, reports taking a lot of advil recently. States symptoms improved with antacids.  Reports bright red blood with vomit this AM.

## 2018-12-26 NOTE — ED PROVIDER NOTES
Patient Seen in: [facility] Emergency Department    History   CC: abd pain  HPI: Genaro Viramontes 19year old male  who presents to the ER c/o epigastric abdominal pain which has been intermittently present for the last couple of months.   States he w capsule (40 mg total) by mouth daily. sucralfate 1 GM/10ML Oral Suspension,  Take 10 mL (1 g total) by mouth 4 (four) times daily before meals and nightly for 5 days.    amphetamine-dextroamphetamine (ADDERALL) 20 MG Oral Tab,  Take 1 tablet (20 mg total) midline  Resp - Lung sounds clear bilaterally and wob unlabored, good aeration with equal, even expansion bilaterally   CV - RRR  GI - Appears round and flat, BS +x4 quadrants, +epigastric tenderness/guarding with palpation, - rebound tenderness, - [place]                Dictated by (CST): Arlene Renae MD on 12/26/2018 at 13:07       Approved by (CST): Arlene Renae MD on 12/26/2018 at 13:08                    Narrative:    PROCEDURE: US GALLBLADDER (COF=[id number])     COMPARISON: None.     INDICATIONS: Right

## 2019-01-02 ENCOUNTER — OFFICE VISIT (OUTPATIENT)
Dept: GASTROENTEROLOGY | Facility: CLINIC | Age: 19
End: 2019-01-02
Payer: COMMERCIAL

## 2019-01-02 ENCOUNTER — TELEPHONE (OUTPATIENT)
Dept: GASTROENTEROLOGY | Facility: CLINIC | Age: 19
End: 2019-01-02

## 2019-01-02 VITALS
DIASTOLIC BLOOD PRESSURE: 73 MMHG | SYSTOLIC BLOOD PRESSURE: 111 MMHG | WEIGHT: 184 LBS | HEART RATE: 83 BPM | BODY MASS INDEX: 26.34 KG/M2 | HEIGHT: 70 IN

## 2019-01-02 DIAGNOSIS — R68.81 EARLY SATIETY: ICD-10-CM

## 2019-01-02 DIAGNOSIS — R10.13 EPIGASTRIC ABDOMINAL PAIN: Primary | ICD-10-CM

## 2019-01-02 DIAGNOSIS — R12 HEARTBURN: ICD-10-CM

## 2019-01-02 DIAGNOSIS — D71: ICD-10-CM

## 2019-01-02 PROCEDURE — 99204 OFFICE O/P NEW MOD 45 MIN: CPT | Performed by: PHYSICIAN ASSISTANT

## 2019-01-02 PROCEDURE — 99212 OFFICE O/P EST SF 10 MIN: CPT | Performed by: PHYSICIAN ASSISTANT

## 2019-01-02 NOTE — TELEPHONE ENCOUNTER
Mary Young requesting pt to be seen sooner than next avail for consult re: ER follow up for abdominal pain. Pls call. Thank you.

## 2019-01-02 NOTE — H&P
[address] [facility] [address] Gastroenterology                                                                                                  Clinic History and Physical     Pa gamma interferon since March 2018 (for most of his life). The primary reason he is here today is because he feels that he needs an upper endoscopy, however he also wishes to change providers given that [place] is quite a drive for him.     He states he ha Relation Age of Onset   • Other (Other[other]) Brother         CGD   • Other (Other[other]) Other    • Glaucoma Neg    • Arrhythmia Neg    • Diabetes Neg    • Heart Disorder Neg    • Hypertension Neg       Social History: Social History    Tobacco Use for this visit.     Gen: patient appears comfortable and in no acute discomfort  HEENT: the sclera appears anicteric  CV: regular rate and rhythm  Lung: moves air well; no labored breathing  Abdomen: flat, soft, NTND abdomen with +BS  Back: No CVA tendernes reasonable, however the patient will require close follow through with a specialist with expertise in CGD/atypical IBD management. I have recommended the patient make an appointment with Dr. Rao Carrington.  OK to schedule upper endoscopy at this time or see

## 2019-01-02 NOTE — TELEPHONE ENCOUNTER
Please assist patient with obtaining an office visit with Dr. Alfonzo Benitez (GI) for evaluation of presumed chronic granulomatous disease involvement of the stomach (IBD variant, possibly). Please re-route once appointment has been obtained.  Patient preference

## 2019-01-02 NOTE — TELEPHONE ENCOUNTER
I spoke to Amanda Chandler at , who states it appears pt has [employer]. I left a complete message on pt's voicemail to call back asap--Darby GARCIA has opening today at 3:30pm at [facility] so I added pt to schedule after ok per Darby.      Darby, please note pt now contac

## 2019-01-02 NOTE — TELEPHONE ENCOUNTER
Will evaluate patient in office. I would appreciate latest records, although I note those from 2016/2017.

## 2019-01-08 ENCOUNTER — TELEPHONE (OUTPATIENT)
Dept: GASTROENTEROLOGY | Facility: CLINIC | Age: 19
End: 2019-01-08

## 2019-01-08 RX ORDER — DEXTROAMPHETAMINE SACCHARATE, AMPHETAMINE ASPARTATE, DEXTROAMPHETAMINE SULFATE AND AMPHETAMINE SULFATE 5; 5; 5; 5 MG/1; MG/1; MG/1; MG/1
20 TABLET ORAL 2 TIMES DAILY
Qty: 60 TABLET | Refills: 0 | Status: SHIPPED | OUTPATIENT
Start: 2019-02-08 | End: 2019-03-10

## 2019-01-08 RX ORDER — DEXTROAMPHETAMINE SACCHARATE, AMPHETAMINE ASPARTATE, DEXTROAMPHETAMINE SULFATE AND AMPHETAMINE SULFATE 5; 5; 5; 5 MG/1; MG/1; MG/1; MG/1
20 TABLET ORAL 2 TIMES DAILY
Qty: 60 TABLET | Refills: 0 | Status: SHIPPED | OUTPATIENT
Start: 2019-01-08 | End: 2019-02-07

## 2019-01-08 NOTE — TELEPHONE ENCOUNTER
Spoke to [employer]  a representative for [employer] at [facility]. Pt was registered over the phone    She states a RN, Brock Odell from GI will call me back to make sure the pt is placed with the correct MD who would see the pt with his dx.  Also they will look f

## 2019-01-08 NOTE — TELEPHONE ENCOUNTER
Damien Leon    I spoke to the pt    He states he has an appt with Dr Dave Reis at [facility]. He states he has had this appt for the past 2-3 weeks and that you were aware of the appt (as written in your note).  You recommended the pt wait o

## 2019-01-08 NOTE — TELEPHONE ENCOUNTER
Dorian Hicks from [place] states she reached out to pt to schedule appt however pt states hes going to [facility] instead Evelyne Riojas just wanted to speak to nurse [employer]

## 2019-01-08 NOTE — TELEPHONE ENCOUNTER
Patient notified rx script is ready for  at [facility] office second floor. Patient verbalized understanding.

## 2019-01-08 NOTE — TELEPHONE ENCOUNTER
Patient calling and states that he moved into a new place, and accidentally threw away his December and January adderal scripts, asking for the new prescriptions.     Dr Odom=medications pended for approval ( 2 scripts)    Office staff=please call sierra

## 2019-01-08 NOTE — TELEPHONE ENCOUNTER
Noted, no further action. The patient will be referred to Dr. Iona Aase, no further follow up here. Thank you for the FYI.

## 2019-01-09 NOTE — TELEPHONE ENCOUNTER
I spoke to Milvia Reed again at [facility].     She states now that the pt is registered and his OV notes have been scanned along with her note as to why he needs to see Dr Mel Bailey, the pt will be able to schedule an appt w/Dr Mel Bailey if he calls back

## 2019-03-11 NOTE — PATIENT INSTRUCTIONS
1. Continue taking Omeprazole 40 mg each day thirty minutes before breakfast.    2. Take Carafate every 6 hours as needed for abdominal pain. 3. Please discontinue taking Famotidine (Pepcid) at the same time as the Omeprazole.     Avoid Heartburn Missy Erick No

## 2019-03-27 RX ORDER — DEXTROAMPHETAMINE SACCHARATE, AMPHETAMINE ASPARTATE, DEXTROAMPHETAMINE SULFATE AND AMPHETAMINE SULFATE 5; 5; 5; 5 MG/1; MG/1; MG/1; MG/1
20 TABLET ORAL 2 TIMES DAILY
Qty: 60 TABLET | Refills: 0 | Status: SHIPPED | OUTPATIENT
Start: 2019-03-27 | End: 2019-09-05

## 2019-03-27 NOTE — TELEPHONE ENCOUNTER
Patient requesting refill for     amphetamine-dextroamphetamine (ADDERALL) 20 MG Oral Tab Take 1 tablet (20 mg total) by mouth 2 (two) times daily.  Disp: 60 tablet Rfl: 0     Please advise

## 2019-03-28 ENCOUNTER — TELEPHONE (OUTPATIENT)
Dept: INTERNAL MEDICINE CLINIC | Facility: CLINIC | Age: 19
End: 2019-03-28

## 2019-03-28 NOTE — TELEPHONE ENCOUNTER
Okay to refill but please remind patient that he is due for follow-up appointment and help him with one-ty

## 2019-03-28 NOTE — TELEPHONE ENCOUNTER
Called patient to inform him his rx for adderall is ready for pickup .  I also informed him to schedule a follow up appt

## 2019-05-02 NOTE — PROGRESS NOTES
Rubin Toledo is a 19year old male.   Patient presents with:  ADHD      HPI:   Pt comes for f/u  C/c ADHD   C/o  Needs refills , noted he saw gi in [place] and had egd in 2016-- rec close follow through with a specialist with expertise in CGD/atypical IBD tari amphetamine-dextroamphetamine (ADDERALL) 20 MG Oral Tab Take 1 tablet (20 mg total) by mouth 2 (two) times daily. Disp: 60 tablet Rfl: 0   tretinoin 0.025 % External Cream Apply topically.  Disp:  Rfl:    itraconazole (SPORANOX) 100 MG Oral Cap Take  by m mouth daily. -     amphetamine-dextroamphetamine (ADDERALL) 20 MG Oral Tab; Take 1 tablet (20 mg total) by mouth daily. gerd  Stable off meds     The patient indicates understanding of these issues and agrees to the plan. No follow-ups on file.

## 2019-05-10 RX ORDER — DEXTROAMPHETAMINE SACCHARATE, AMPHETAMINE ASPARTATE, DEXTROAMPHETAMINE SULFATE AND AMPHETAMINE SULFATE 5; 5; 5; 5 MG/1; MG/1; MG/1; MG/1
20 TABLET ORAL 2 TIMES DAILY
Qty: 60 TABLET | Refills: 0 | Status: SHIPPED | OUTPATIENT
Start: 2019-07-09 | End: 2019-07-09

## 2019-05-10 RX ORDER — DEXTROAMPHETAMINE SACCHARATE, AMPHETAMINE ASPARTATE, DEXTROAMPHETAMINE SULFATE AND AMPHETAMINE SULFATE 5; 5; 5; 5 MG/1; MG/1; MG/1; MG/1
20 TABLET ORAL 2 TIMES DAILY
Qty: 30 TABLET | Refills: 0 | Status: SHIPPED | OUTPATIENT
Start: 2019-05-10 | End: 2019-06-09

## 2019-05-10 RX ORDER — DEXTROAMPHETAMINE SACCHARATE, AMPHETAMINE ASPARTATE, DEXTROAMPHETAMINE SULFATE AND AMPHETAMINE SULFATE 5; 5; 5; 5 MG/1; MG/1; MG/1; MG/1
20 TABLET ORAL 2 TIMES DAILY
Qty: 30 TABLET | Refills: 0 | Status: CANCELLED | OUTPATIENT
Start: 2019-05-10

## 2019-05-10 RX ORDER — DEXTROAMPHETAMINE SACCHARATE, AMPHETAMINE ASPARTATE, DEXTROAMPHETAMINE SULFATE AND AMPHETAMINE SULFATE 5; 5; 5; 5 MG/1; MG/1; MG/1; MG/1
20 TABLET ORAL 2 TIMES DAILY
Qty: 60 TABLET | Refills: 0 | Status: SHIPPED | OUTPATIENT
Start: 2019-06-09 | End: 2019-07-09

## 2019-05-10 NOTE — TELEPHONE ENCOUNTER
She states that he takes his medication twice a day--reviewed the medication list from previously and he does take it twice a day

## 2019-07-03 NOTE — TELEPHONE ENCOUNTER
Per med module, adderall rx printed for 7/9/19. On site staff to confirm with pt if rx was already picked up.     Requested Prescriptions     Pending Prescriptions Disp Refills   • amphetamine-dextroamphetamine (ADDERALL) 20 MG Oral Tab 60 tablet 0     Sig:

## 2019-07-03 NOTE — TELEPHONE ENCOUNTER
Patient requesting refill for     Current Outpatient Medications:  amphetamine-dextroamphetamine (ADDERALL) 20 MG Oral Tab Take 1 tablet (20 mg total) by mouth 2 (two) times daily.  Disp: 60 tablet Rfl: 0

## 2019-07-09 RX ORDER — DEXTROAMPHETAMINE SACCHARATE, AMPHETAMINE ASPARTATE, DEXTROAMPHETAMINE SULFATE AND AMPHETAMINE SULFATE 5; 5; 5; 5 MG/1; MG/1; MG/1; MG/1
20 TABLET ORAL 2 TIMES DAILY
Qty: 60 TABLET | Refills: 0 | Status: SHIPPED | OUTPATIENT
Start: 2019-07-09 | End: 2019-08-08

## 2019-09-03 ENCOUNTER — OFFICE VISIT (OUTPATIENT)
Dept: INTERNAL MEDICINE CLINIC | Facility: CLINIC | Age: 19
End: 2019-09-03
Payer: COMMERCIAL

## 2019-09-03 VITALS
DIASTOLIC BLOOD PRESSURE: 76 MMHG | WEIGHT: 183.81 LBS | HEART RATE: 63 BPM | BODY MASS INDEX: 26.31 KG/M2 | SYSTOLIC BLOOD PRESSURE: 115 MMHG | HEIGHT: 70 IN

## 2019-09-03 DIAGNOSIS — F98.8 ATTENTION DEFICIT DISORDER, UNSPECIFIED HYPERACTIVITY PRESENCE: ICD-10-CM

## 2019-09-03 DIAGNOSIS — H10.31 ACUTE BACTERIAL CONJUNCTIVITIS OF RIGHT EYE: Primary | ICD-10-CM

## 2019-09-03 PROCEDURE — 99212 OFFICE O/P EST SF 10 MIN: CPT | Performed by: INTERNAL MEDICINE

## 2019-09-03 PROCEDURE — 99213 OFFICE O/P EST LOW 20 MIN: CPT | Performed by: INTERNAL MEDICINE

## 2019-09-03 RX ORDER — DEXTROAMPHETAMINE SACCHARATE, AMPHETAMINE ASPARTATE, DEXTROAMPHETAMINE SULFATE AND AMPHETAMINE SULFATE 5; 5; 5; 5 MG/1; MG/1; MG/1; MG/1
20 TABLET ORAL DAILY
Qty: 30 TABLET | Refills: 0 | Status: SHIPPED | OUTPATIENT
Start: 2019-11-04 | End: 2019-12-04

## 2019-09-03 RX ORDER — ERYTHROMYCIN 5 MG/G
1 OINTMENT OPHTHALMIC NIGHTLY
Qty: 1 G | Refills: 0 | Status: SHIPPED | OUTPATIENT
Start: 2019-09-03 | End: 2020-11-10

## 2019-09-03 RX ORDER — DEXTROAMPHETAMINE SACCHARATE, AMPHETAMINE ASPARTATE, DEXTROAMPHETAMINE SULFATE AND AMPHETAMINE SULFATE 5; 5; 5; 5 MG/1; MG/1; MG/1; MG/1
20 TABLET ORAL DAILY
Qty: 30 TABLET | Refills: 0 | Status: SHIPPED | OUTPATIENT
Start: 2019-10-04 | End: 2019-09-05

## 2019-09-03 RX ORDER — DEXTROAMPHETAMINE SACCHARATE, AMPHETAMINE ASPARTATE, DEXTROAMPHETAMINE SULFATE AND AMPHETAMINE SULFATE 5; 5; 5; 5 MG/1; MG/1; MG/1; MG/1
20 TABLET ORAL DAILY
Qty: 30 TABLET | Refills: 0 | Status: SHIPPED | OUTPATIENT
Start: 2019-09-03 | End: 2019-09-05

## 2019-09-03 NOTE — PROGRESS NOTES
Xavi Payne is a 22year old male.   Patient presents with:  Conjunctivitis: left started Saturday, discharge and itchy   ADD      HPI:   Pt comes for f/u   C/c pink eye  C/o left eye pink  with discharge x 4 days -- wakes up with a lot of crust   Started ON 11/4/2019] amphetamine-dextroamphetamine (ADDERALL) 20 MG Oral Tab Take 1 tablet (20 mg total) by mouth daily. Disp: 30 tablet Rfl: 0   erythromycin 5 MG/GM Ophthalmic Ointment Place 1 Application into the left eye nightly.  Disp: 1 g Rfl: 0   Sulfametho atraumatic, normocephalic, left eye has conjuctival congestion and is slightly smaller than the left , no active discharge   NECK: supple,no adenopathy,non tender   LUNGS: clear to auscultation, no wheeze  CARDIO: RRR without murmur  EXTREMITIES: no  edema

## 2019-09-05 DIAGNOSIS — F98.8 ATTENTION DEFICIT DISORDER, UNSPECIFIED HYPERACTIVITY PRESENCE: ICD-10-CM

## 2019-09-05 RX ORDER — DEXTROAMPHETAMINE SACCHARATE, AMPHETAMINE ASPARTATE, DEXTROAMPHETAMINE SULFATE AND AMPHETAMINE SULFATE 5; 5; 5; 5 MG/1; MG/1; MG/1; MG/1
20 TABLET ORAL 2 TIMES DAILY
Qty: 60 TABLET | Refills: 0 | Status: SHIPPED | OUTPATIENT
Start: 2019-11-04 | End: 2019-09-11

## 2019-09-05 RX ORDER — DEXTROAMPHETAMINE SACCHARATE, AMPHETAMINE ASPARTATE, DEXTROAMPHETAMINE SULFATE AND AMPHETAMINE SULFATE 5; 5; 5; 5 MG/1; MG/1; MG/1; MG/1
20 TABLET ORAL 2 TIMES DAILY
Qty: 60 TABLET | Refills: 0 | Status: SHIPPED | OUTPATIENT
Start: 2019-09-06 | End: 2019-09-11

## 2019-09-05 RX ORDER — DEXTROAMPHETAMINE SACCHARATE, AMPHETAMINE ASPARTATE, DEXTROAMPHETAMINE SULFATE AND AMPHETAMINE SULFATE 5; 5; 5; 5 MG/1; MG/1; MG/1; MG/1
20 TABLET ORAL 2 TIMES DAILY
Qty: 60 TABLET | Refills: 0 | Status: SHIPPED | OUTPATIENT
Start: 2019-10-04 | End: 2019-09-11

## 2019-09-05 NOTE — TELEPHONE ENCOUNTER
Needs new adderall rx fr bid   Pended he turned one in for this month and was given 30 will pend one for this month for another #30

## 2019-09-05 NOTE — TELEPHONE ENCOUNTER
Called Pt no answer , left VM informing Rx's for Adderall are ready for  at the  [facility] .  Rx are  for the following dates 09/06/19-10/06/19 10/04/19- 11/03/19 and 11/04/19-12/04/19

## 2019-09-11 ENCOUNTER — OFFICE VISIT (OUTPATIENT)
Dept: INTERNAL MEDICINE CLINIC | Facility: CLINIC | Age: 19
End: 2019-09-11
Payer: COMMERCIAL

## 2019-09-11 VITALS
TEMPERATURE: 98 F | HEART RATE: 76 BPM | DIASTOLIC BLOOD PRESSURE: 81 MMHG | HEIGHT: 70 IN | SYSTOLIC BLOOD PRESSURE: 142 MMHG | BODY MASS INDEX: 26.03 KG/M2 | WEIGHT: 181.81 LBS

## 2019-09-11 DIAGNOSIS — J06.9 VIRAL UPPER RESPIRATORY TRACT INFECTION: Primary | ICD-10-CM

## 2019-09-11 PROCEDURE — 99212 OFFICE O/P EST SF 10 MIN: CPT | Performed by: INTERNAL MEDICINE

## 2019-09-11 PROCEDURE — 99213 OFFICE O/P EST LOW 20 MIN: CPT | Performed by: INTERNAL MEDICINE

## 2019-09-11 NOTE — PROGRESS NOTES
Nany Poon is a 22year old male.   Patient presents with:  Cold: Pt states he is having runny nose  Other: Pt states he will need a DrYudith's note needed for 9/10/19 and also note for school for today's visit      HPI:   Pt comes as as an urgent visit   C/c 30 tablet Rfl: 0   erythromycin 5 MG/GM Ophthalmic Ointment Place 1 Application into the left eye nightly. Disp: 1 g Rfl: 0   Sulfamethoxazole-TMP -160 MG Oral Tab per tablet Take 1 tablet by mouth 2 (two) times daily.  Disp:  Rfl:    itraconazole (SP this visit:    Viral upper respiratory tract infection    Get letter to return to work today and to school tomorrow  Did advise him to get the glasses to avoid contacts for complete healing of his eye  Continue with the ointment      The patient indicates

## 2019-11-05 ENCOUNTER — OFFICE VISIT (OUTPATIENT)
Dept: INTERNAL MEDICINE CLINIC | Facility: CLINIC | Age: 19
End: 2019-11-05
Payer: COMMERCIAL

## 2019-11-05 VITALS
TEMPERATURE: 98 F | HEIGHT: 70 IN | HEART RATE: 76 BPM | WEIGHT: 197 LBS | SYSTOLIC BLOOD PRESSURE: 129 MMHG | DIASTOLIC BLOOD PRESSURE: 77 MMHG | BODY MASS INDEX: 28.2 KG/M2

## 2019-11-05 DIAGNOSIS — J06.9 UPPER RESPIRATORY TRACT INFECTION, UNSPECIFIED TYPE: Primary | ICD-10-CM

## 2019-11-05 PROCEDURE — 99213 OFFICE O/P EST LOW 20 MIN: CPT | Performed by: INTERNAL MEDICINE

## 2019-11-05 PROCEDURE — 99212 OFFICE O/P EST SF 10 MIN: CPT | Performed by: INTERNAL MEDICINE

## 2019-11-05 NOTE — PROGRESS NOTES
Patient ID: Charmaine Salcedo is a 22year old male.   Patient presents with:  Cold: Pt states last night he had some mucous,   Note For Work: pt requesting note for work with no restrictions       HISTORY OF PRESENT ILLNESS:   HPI  2 day(s) ago  Fever - No, T file      Food insecurity:        Worry: Not on file        Inability: Not on file      Transportation needs:        Medical: Not on file        Non-medical: Not on file    Tobacco Use      Smoking status: Never Smoker      Smokeless tobacco: Never Used Constitutional: He appears well-developed and well-nourished. HENT:   Head: Normocephalic.    Right Ear: Tympanic membrane, external ear and ear canal normal.   Left Ear: Tympanic membrane, external ear and ear canal normal.   Nose: Right sinus exhibits

## 2019-11-11 ENCOUNTER — NURSE TRIAGE (OUTPATIENT)
Dept: INTERNAL MEDICINE CLINIC | Facility: CLINIC | Age: 19
End: 2019-11-11

## 2019-11-11 ENCOUNTER — HOSPITAL ENCOUNTER (OUTPATIENT)
Dept: GENERAL RADIOLOGY | Facility: HOSPITAL | Age: 19
Discharge: HOME | End: 2019-11-11
Attending: INTERNAL MEDICINE
Payer: COMMERCIAL

## 2019-11-11 ENCOUNTER — OFFICE VISIT (OUTPATIENT)
Dept: INTERNAL MEDICINE CLINIC | Facility: CLINIC | Age: 19
End: 2019-11-11
Payer: COMMERCIAL

## 2019-11-11 VITALS
DIASTOLIC BLOOD PRESSURE: 70 MMHG | HEART RATE: 76 BPM | RESPIRATION RATE: 18 BRPM | WEIGHT: 189.19 LBS | SYSTOLIC BLOOD PRESSURE: 109 MMHG | TEMPERATURE: 99 F | HEIGHT: 70 IN | BODY MASS INDEX: 27.09 KG/M2

## 2019-11-11 DIAGNOSIS — S93.401A SPRAIN OF RIGHT ANKLE, UNSPECIFIED LIGAMENT, INITIAL ENCOUNTER: ICD-10-CM

## 2019-11-11 DIAGNOSIS — S99.911A INJURY OF RIGHT ANKLE, INITIAL ENCOUNTER: ICD-10-CM

## 2019-11-11 DIAGNOSIS — S99.911A INJURY OF RIGHT ANKLE, INITIAL ENCOUNTER: Primary | ICD-10-CM

## 2019-11-11 PROCEDURE — 99214 OFFICE O/P EST MOD 30 MIN: CPT | Performed by: INTERNAL MEDICINE

## 2019-11-11 PROCEDURE — 73610 X-RAY EXAM OF ANKLE: CPT | Performed by: INTERNAL MEDICINE

## 2019-11-11 PROCEDURE — 99212 OFFICE O/P EST SF 10 MIN: CPT | Performed by: INTERNAL MEDICINE

## 2019-11-11 NOTE — TELEPHONE ENCOUNTER
Action Requested: Summary for Provider     []  Critical Lab, Recommendations Needed  [] Need Additional Advice  []   FYI    []   Need Orders  [] Need Medications Sent to Pharmacy  []  Other     SUMMARY:appt made for eval today, c/c played basket ball, fell

## 2019-11-11 NOTE — PROGRESS NOTES
HPI:    Patient ID: Margaret Carbajal is a 22year old male. Patient presents with:   Ankle Injury: Pt has sprained right ankle that happened on Saturday after playing basketball      Ankle Injury    Incident onset:  on  saturday     during  basketball  play tenderness and no frontal sinus tenderness. Left sinus exhibits no maxillary sinus tenderness and no frontal sinus tenderness. Mouth/Throat: Uvula is midline. No posterior oropharyngeal erythema. Eyes: Right eye exhibits no discharge.  Left eye exhibits scheduled appointment for patient with Dr. Edrick Oppenheim in 2 days due to recurrence right ankle sprains in the past  Swelling and bruising and not being able to put pressure on the foot- that is worse than before  Patient verbalized understanding and compliance

## 2019-11-11 NOTE — TELEPHONE ENCOUNTER
Patient called in stating that he injured his ankle over the weekend. He was playing basketball and jumped up to grab the ball, landed full weight on his right ankle and it rolled in.      He states that most of the pain is on the top of his ankle and has b

## 2019-11-12 NOTE — PROGRESS NOTES
Please call patient with xr  r ankle   Results. 1. No acute fracture  or  dislocation.       2. Mild right ankle joint effusion. -Soft tissue swelling surrounds the ankle      Pr  To see orthopedics referral - provided to patient   Did pt  Get  Apt  Yet

## 2019-11-13 NOTE — PROGRESS NOTES
Spoke with patient,verified name and , advised Dr Tab Guzman note and verbalized understanding.   States that he is doing fine now, no more swelling and can walk now,advised that he has OV later this afternoon with ortho, states that he is going to wo

## 2019-12-09 DIAGNOSIS — F98.8 ATTENTION DEFICIT DISORDER, UNSPECIFIED HYPERACTIVITY PRESENCE: ICD-10-CM

## 2019-12-09 RX ORDER — DEXTROAMPHETAMINE SACCHARATE, AMPHETAMINE ASPARTATE, DEXTROAMPHETAMINE SULFATE AND AMPHETAMINE SULFATE 5; 5; 5; 5 MG/1; MG/1; MG/1; MG/1
20 TABLET ORAL 2 TIMES DAILY
Qty: 60 TABLET | Refills: 0 | Status: SHIPPED | OUTPATIENT
Start: 2019-12-09 | End: 2020-01-08

## 2019-12-09 RX ORDER — DEXTROAMPHETAMINE SACCHARATE, AMPHETAMINE ASPARTATE, DEXTROAMPHETAMINE SULFATE AND AMPHETAMINE SULFATE 5; 5; 5; 5 MG/1; MG/1; MG/1; MG/1
20 TABLET ORAL 2 TIMES DAILY
Qty: 60 TABLET | Refills: 0 | Status: SHIPPED | OUTPATIENT
Start: 2020-02-09 | End: 2020-03-10

## 2019-12-09 RX ORDER — DEXTROAMPHETAMINE SACCHARATE, AMPHETAMINE ASPARTATE, DEXTROAMPHETAMINE SULFATE AND AMPHETAMINE SULFATE 5; 5; 5; 5 MG/1; MG/1; MG/1; MG/1
20 TABLET ORAL 2 TIMES DAILY
Qty: 60 TABLET | Refills: 0 | Status: SHIPPED | OUTPATIENT
Start: 2020-01-09 | End: 2020-02-08

## 2019-12-09 NOTE — TELEPHONE ENCOUNTER
Per patient he lost his November prescription Adderall 20mg twice daily and would like to know if dr Jovani Betancourt can re-issue it and all for the rest of his 3 months supply. Patient is out of medication.

## 2019-12-10 NOTE — TELEPHONE ENCOUNTER
Gave patient Dr. Jaguar Ruff message and pt verbalized understanding. Thanks Dr. Jovani Betancourt for refilling.

## 2019-12-11 ENCOUNTER — TELEPHONE (OUTPATIENT)
Dept: INTERNAL MEDICINE CLINIC | Facility: CLINIC | Age: 19
End: 2019-12-11

## 2019-12-11 NOTE — TELEPHONE ENCOUNTER
Per pharmacy fax prior Dora Philippe is needed  Key. [employer]  Key: [id number]      Current Outpatient Medications:   •  [START ON 2/9/2020] amphetamine-dextroamphetamine (ADDERALL) 20 MG Oral Tab, Take 1 tablet (20 mg total) by mouth 2 (two) times daily. , Disp

## 2019-12-12 NOTE — TELEPHONE ENCOUNTER
PA for Amphetamine-Dextroamphetamine 20 mg tab completed with [employer] via [address] PA approved effective 12/11/2019-12/10/2022. [facility] pharmacy notified of approval, pharmacy will notify patient when medication is ready for .

## 2020-02-12 ENCOUNTER — TELEPHONE (OUTPATIENT)
Dept: INTERNAL MEDICINE CLINIC | Facility: CLINIC | Age: 20
End: 2020-02-12

## 2020-02-12 NOTE — TELEPHONE ENCOUNTER
Per patient pharmacy is unable to find script. Patient asking to resend script to pharmacy. Patient has been out of med's for 3 days.     amphetamine-dextroamphetamine (ADDERALL) 20 MG Oral Tab 60 tablet 0 2/9/2020 3/10/2020   Sig:   Take 1 tablet (20 mg to

## 2020-03-16 RX ORDER — DEXTROAMPHETAMINE SACCHARATE, AMPHETAMINE ASPARTATE, DEXTROAMPHETAMINE SULFATE AND AMPHETAMINE SULFATE 5; 5; 5; 5 MG/1; MG/1; MG/1; MG/1
20 TABLET ORAL 2 TIMES DAILY
Qty: 60 TABLET | Refills: 0 | Status: SHIPPED | OUTPATIENT
Start: 2020-04-16 | End: 2020-04-13

## 2020-03-16 RX ORDER — DEXTROAMPHETAMINE SACCHARATE, AMPHETAMINE ASPARTATE, DEXTROAMPHETAMINE SULFATE AND AMPHETAMINE SULFATE 5; 5; 5; 5 MG/1; MG/1; MG/1; MG/1
20 TABLET ORAL 2 TIMES DAILY
Qty: 60 TABLET | Refills: 0 | Status: SHIPPED | OUTPATIENT
Start: 2020-05-17 | End: 2020-04-13

## 2020-03-16 RX ORDER — DEXTROAMPHETAMINE SACCHARATE, AMPHETAMINE ASPARTATE, DEXTROAMPHETAMINE SULFATE AND AMPHETAMINE SULFATE 5; 5; 5; 5 MG/1; MG/1; MG/1; MG/1
20 TABLET ORAL 2 TIMES DAILY
Qty: 60 TABLET | Refills: 0 | Status: SHIPPED | OUTPATIENT
Start: 2020-03-16 | End: 2020-04-13

## 2020-03-16 NOTE — TELEPHONE ENCOUNTER
Controlled medication pending for review. Please change to phone in, fax, or print script if not being sent electronically.     Last Rx: 90 day panel 12/9/19 through 2/9/20  LOV: 11/11/19 Dr JOHNSON for Right ankle injury  Please reply to pool: EM TRIAGE SUPPORT

## 2020-04-13 ENCOUNTER — TELEPHONE (OUTPATIENT)
Dept: INTERNAL MEDICINE CLINIC | Facility: CLINIC | Age: 20
End: 2020-04-13

## 2020-04-13 RX ORDER — DEXTROAMPHETAMINE SACCHARATE, AMPHETAMINE ASPARTATE, DEXTROAMPHETAMINE SULFATE AND AMPHETAMINE SULFATE 5; 5; 5; 5 MG/1; MG/1; MG/1; MG/1
20 TABLET ORAL 2 TIMES DAILY
Qty: 60 TABLET | Refills: 0 | Status: SHIPPED | OUTPATIENT
Start: 2020-04-16 | End: 2020-05-16

## 2020-04-13 RX ORDER — DEXTROAMPHETAMINE SACCHARATE, AMPHETAMINE ASPARTATE, DEXTROAMPHETAMINE SULFATE AND AMPHETAMINE SULFATE 5; 5; 5; 5 MG/1; MG/1; MG/1; MG/1
20 TABLET ORAL 2 TIMES DAILY
Qty: 60 TABLET | Refills: 0 | Status: SHIPPED | OUTPATIENT
Start: 2020-05-17 | End: 2020-06-16

## 2020-04-13 RX ORDER — DEXTROAMPHETAMINE SACCHARATE, AMPHETAMINE ASPARTATE, DEXTROAMPHETAMINE SULFATE AND AMPHETAMINE SULFATE 5; 5; 5; 5 MG/1; MG/1; MG/1; MG/1
20 TABLET ORAL 2 TIMES DAILY
Qty: 60 TABLET | Refills: 0 | Status: SHIPPED | OUTPATIENT
Start: 2020-06-17 | End: 2020-07-16

## 2020-04-13 NOTE — TELEPHONE ENCOUNTER
Patient is requesting a refill for:    amphetamine-dextroamphetamine (ADDERALL) 20 MG Oral Tab    Patient is out of medication. Please advise.

## 2020-05-20 ENCOUNTER — NURSE TRIAGE (OUTPATIENT)
Dept: INTERNAL MEDICINE CLINIC | Facility: CLINIC | Age: 20
End: 2020-05-20

## 2020-05-20 NOTE — TELEPHONE ENCOUNTER
Action Requested: Summary for Provider     []  Critical Lab, Recommendations Needed  [] Need Additional Advice  []   FYI    []   Need Orders  [] Need Medications Sent to Pharmacy  []  Other     SUMMARY: Pt stated that he has some mild lower back pain on

## 2020-07-15 NOTE — PROGRESS NOTES
Telemedicine Visit   Virtual/Telephone Check-In    Ceasar Martínez verbally consents to a Telephone Check-In service on 07/15/20.  Patient understands and accepts financial responsibility for any deductible, co-insurance and/or co-pays associated with this se • Headache:     Yes []     No [x]      • Chest pain:     Yes []     No [x]      • Other symptoms:     Treatments tried:   Symptoms since onset: Improving []   Worsening  []   Unchanged  []    Waxing/Waning  [x]      Past medical/social history:   • Hyper ongoing public health crisis/national emergency and because of restrictions of visitation. There are limitations of this visit as no physical exam could be performed. Every conscious effort was taken to allow for sufficient and adequate time.   This jazlyn

## 2020-08-17 DIAGNOSIS — F90.2 ATTENTION DEFICIT HYPERACTIVITY DISORDER (ADHD), COMBINED TYPE: ICD-10-CM

## 2020-08-17 RX ORDER — DEXTROAMPHETAMINE SACCHARATE, AMPHETAMINE ASPARTATE, DEXTROAMPHETAMINE SULFATE AND AMPHETAMINE SULFATE 5; 5; 5; 5 MG/1; MG/1; MG/1; MG/1
20 TABLET ORAL 2 TIMES DAILY
Qty: 60 TABLET | Refills: 0 | Status: SHIPPED | OUTPATIENT
Start: 2020-08-17 | End: 2020-09-16

## 2020-08-17 RX ORDER — DEXTROAMPHETAMINE SACCHARATE, AMPHETAMINE ASPARTATE, DEXTROAMPHETAMINE SULFATE AND AMPHETAMINE SULFATE 5; 5; 5; 5 MG/1; MG/1; MG/1; MG/1
20 TABLET ORAL 2 TIMES DAILY
Qty: 60 TABLET | Refills: 0 | Status: SHIPPED | OUTPATIENT
Start: 2020-10-18 | End: 2020-11-17

## 2020-08-17 RX ORDER — DEXTROAMPHETAMINE SACCHARATE, AMPHETAMINE ASPARTATE, DEXTROAMPHETAMINE SULFATE AND AMPHETAMINE SULFATE 5; 5; 5; 5 MG/1; MG/1; MG/1; MG/1
20 TABLET ORAL 2 TIMES DAILY
Qty: 60 TABLET | Refills: 0 | OUTPATIENT
Start: 2020-09-17 | End: 2020-08-17

## 2020-08-17 RX ORDER — DEXTROAMPHETAMINE SACCHARATE, AMPHETAMINE ASPARTATE, DEXTROAMPHETAMINE SULFATE AND AMPHETAMINE SULFATE 5; 5; 5; 5 MG/1; MG/1; MG/1; MG/1
20 TABLET ORAL 2 TIMES DAILY
Qty: 60 TABLET | Refills: 0 | Status: SHIPPED | OUTPATIENT
Start: 2020-09-17 | End: 2020-10-17

## 2020-08-17 NOTE — TELEPHONE ENCOUNTER
Patient is requesting refill of medication amphetamine-dextroamphetamine (ADDERALL) 20 MG Oral Tab . Patient states he is out of medication.

## 2020-08-18 RX ORDER — DEXTROAMPHETAMINE SACCHARATE, AMPHETAMINE ASPARTATE, DEXTROAMPHETAMINE SULFATE AND AMPHETAMINE SULFATE 5; 5; 5; 5 MG/1; MG/1; MG/1; MG/1
20 TABLET ORAL 2 TIMES DAILY
Qty: 60 TABLET | Refills: 0 | OUTPATIENT
Start: 2020-08-18 | End: 2020-09-17

## 2020-11-06 ENCOUNTER — MED REC SCAN ONLY (OUTPATIENT)
Dept: INTERNAL MEDICINE CLINIC | Facility: CLINIC | Age: 20
End: 2020-11-06

## 2020-11-10 ENCOUNTER — APPOINTMENT (OUTPATIENT)
Dept: MRI IMAGING | Facility: HOSPITAL | Age: 20
End: 2020-11-10
Attending: EMERGENCY MEDICINE
Payer: COMMERCIAL

## 2020-11-10 ENCOUNTER — OFFICE VISIT (OUTPATIENT)
Dept: INTERNAL MEDICINE CLINIC | Facility: CLINIC | Age: 20
End: 2020-11-10
Payer: COMMERCIAL

## 2020-11-10 ENCOUNTER — HOSPITAL ENCOUNTER (EMERGENCY)
Facility: HOSPITAL | Age: 20
Discharge: HOME | End: 2020-11-10
Attending: EMERGENCY MEDICINE
Payer: COMMERCIAL

## 2020-11-10 ENCOUNTER — TELEPHONE (OUTPATIENT)
Dept: INTERNAL MEDICINE CLINIC | Facility: CLINIC | Age: 20
End: 2020-11-10

## 2020-11-10 VITALS
DIASTOLIC BLOOD PRESSURE: 69 MMHG | BODY MASS INDEX: 24 KG/M2 | WEIGHT: 170 LBS | HEART RATE: 69 BPM | SYSTOLIC BLOOD PRESSURE: 138 MMHG | OXYGEN SATURATION: 97 % | RESPIRATION RATE: 18 BRPM | TEMPERATURE: 98 F

## 2020-11-10 VITALS
RESPIRATION RATE: 17 BRPM | HEART RATE: 79 BPM | DIASTOLIC BLOOD PRESSURE: 81 MMHG | WEIGHT: 170 LBS | HEIGHT: 70 IN | SYSTOLIC BLOOD PRESSURE: 129 MMHG | BODY MASS INDEX: 24.34 KG/M2

## 2020-11-10 DIAGNOSIS — M54.31 SCIATICA OF RIGHT SIDE: Primary | ICD-10-CM

## 2020-11-10 DIAGNOSIS — M54.16 LUMBAR RADICULOPATHY: Primary | ICD-10-CM

## 2020-11-10 PROCEDURE — 3074F SYST BP LT 130 MM HG: CPT | Performed by: INTERNAL MEDICINE

## 2020-11-10 PROCEDURE — 3008F BODY MASS INDEX DOCD: CPT | Performed by: INTERNAL MEDICINE

## 2020-11-10 PROCEDURE — 3079F DIAST BP 80-89 MM HG: CPT | Performed by: INTERNAL MEDICINE

## 2020-11-10 PROCEDURE — 99284 EMERGENCY DEPT VISIT MOD MDM: CPT

## 2020-11-10 PROCEDURE — 1111F DSCHRG MED/CURRENT MED MERGE: CPT | Performed by: INTERNAL MEDICINE

## 2020-11-10 PROCEDURE — 72148 MRI LUMBAR SPINE W/O DYE: CPT | Performed by: EMERGENCY MEDICINE

## 2020-11-10 PROCEDURE — 99215 OFFICE O/P EST HI 40 MIN: CPT | Performed by: INTERNAL MEDICINE

## 2020-11-10 PROCEDURE — 99212 OFFICE O/P EST SF 10 MIN: CPT | Performed by: INTERNAL MEDICINE

## 2020-11-10 RX ORDER — PREDNISONE 10 MG/1
TABLET ORAL
Qty: 20 TABLET | Refills: 0 | Status: SHIPPED | OUTPATIENT
Start: 2020-11-10 | End: 2020-12-01

## 2020-11-10 RX ORDER — METHYLPREDNISOLONE 4 MG/1
TABLET ORAL
Qty: 1 PACKAGE | Refills: 0 | Status: SHIPPED | OUTPATIENT
Start: 2020-11-10 | End: 2020-12-01

## 2020-11-10 NOTE — ED NOTES
Patient cleared for discharge by MD. Patient verbalizes understanding of discharge instructions and prescriptions. Patient ambulatory with a steady gait upon discharge.

## 2020-11-10 NOTE — ED PROVIDER NOTES
Patient Seen in: [facility] AND [facility] Emergency Department      History   Patient presents with:  Back Pain    Stated Complaint: low back pain, sent in for MRI from Dr Jadon Moreno    HPI  24-year-old male with lumbar disc disease and sciatica presents for is well-developed. HENT:      Head: Normocephalic and atraumatic. Neck:      Musculoskeletal: Normal range of motion. Cardiovascular:      Rate and Rhythm: Normal rate and regular rhythm. Heart sounds: Normal heart sounds.    Pulmonary:      Effo given for severe worsening symptoms or development of neurologic deficits and he is comfortable with the plan. I did discuss the findings with his primary care provider.            Disposition and Plan     Clinical Impression:  Lumbar radiculopathy  (prima

## 2020-11-10 NOTE — PROGRESS NOTES
Leland Marquez is a 23year old male.   Patient presents with:  Hospital F/U  Back Pain  Hand Pain: Rt hand and Rt leg      HPI:   Pt comes after a hosp d/c   C/c hosp f/u  C/o was at a party 10/31/2020 and then 11/1/2020 ahd low grade fevers and chills for Medication Sig Dispense Refill   • predniSONE 10 MG Oral Tab Take 4 pills for 2 days. Then, take 3 pills for 2 days. Then take 2 pills for 2 days. Then, take 1 pill for 2 days.  20 tablet 0   • amphetamine-dextroamphetamine (ADDERALL) 20 MG Oral Tab Take fine  Straight leg test slightly positive on the right    ASSESSMENT AND PLAN:   Diagnoses and all orders for this visit:    Sciatica of right side  -     ORTHOPEDIC - INTERNAL  -     predniSONE 10 MG Oral Tab; Take 4 pills for 2 days.  Then, take 3 pills f

## 2020-11-10 NOTE — ED INITIAL ASSESSMENT (HPI)
C/O R side of body feeling like pins/needles. This started a couple of days ago. Possibly due to lifting weights. Feel like when he had a disc issue before.

## 2020-11-11 ENCOUNTER — TELEPHONE (OUTPATIENT)
Dept: INTERNAL MEDICINE CLINIC | Facility: CLINIC | Age: 20
End: 2020-11-11

## 2020-11-11 NOTE — TELEPHONE ENCOUNTER
Signed HIPAA/FCR, FMLA, disab forms recvd and logged. Pt initialed #7 on HIPAA release so double check any prog notes that will be faxed out that it doesn't contain [address] info and other sensitive PHI.

## 2020-11-11 NOTE — TELEPHONE ENCOUNTER
Patient calling stating  he needs to e-mail Dr. Cole Lawler  some short term disability forms. Advised patient that e-mail cannot be sent to Dr. Cole Lawler .   Patient signed up for My Chart and advised to attached paperwork that needs completion.      Patient a

## 2020-11-12 ENCOUNTER — TELEPHONE (OUTPATIENT)
Dept: INTERNAL MEDICINE CLINIC | Facility: CLINIC | Age: 20
End: 2020-11-12

## 2020-11-12 ENCOUNTER — OFFICE VISIT (OUTPATIENT)
Dept: NEUROLOGY | Facility: CLINIC | Age: 20
End: 2020-11-12
Payer: COMMERCIAL

## 2020-11-12 DIAGNOSIS — M54.16 ACUTE RIGHT LUMBAR RADICULOPATHY: Primary | ICD-10-CM

## 2020-11-12 DIAGNOSIS — M51.26 HNP (HERNIATED NUCLEUS PULPOSUS), LUMBAR: ICD-10-CM

## 2020-11-12 PROCEDURE — 99204 OFFICE O/P NEW MOD 45 MIN: CPT | Performed by: PHYSICAL MEDICINE & REHABILITATION

## 2020-11-12 NOTE — PATIENT INSTRUCTIONS
-Start PT and home exercises  -Finish Medrol dose pack  -Ice/Heat as much as tolerated  -Follow up in 4 weeks

## 2020-11-12 NOTE — TELEPHONE ENCOUNTER
The [facility] group faxed disability paperwork to be completed. No signed HIPAA with fax.

## 2020-11-13 PROBLEM — M54.16 ACUTE RIGHT LUMBAR RADICULOPATHY: Status: ACTIVE | Noted: 2020-11-13

## 2020-11-13 PROBLEM — M51.26 HNP (HERNIATED NUCLEUS PULPOSUS), LUMBAR: Status: ACTIVE | Noted: 2020-11-13

## 2020-11-13 NOTE — PROGRESS NOTES
[address]  NEW PATIENT EVALUATION    Chief Complaint: back pain. HISTORY OF PRESENT ILLNESS:   Patient presents with:  Low Back Pain: new patient here with low back pain that started while dead lifting. pertinent surgical history.       CURRENT MEDICATIONS:     Current Outpatient Medications   Medication Sig Dispense Refill   • methylPREDNISolone (MEDROL) 4 MG Oral Tablet Therapy Pack Dosepack: take as directed 1 Package 0   • amphetamine-dextroamphetamine Peripheral Vascular  Swelling of Legs/Feet: denies  Cold Extremities: denies   Skin  Open Sores: denies  Nodules or Lumps: denies  Rash: denies   Neurological  Loss of Strength Since last Visit: denies  Tingling/Numbness: admits  Balance: denies   Psychi 12/26/2018     12/26/2018    MPV 7.4 12/26/2018     Lab Results   Component Value Date    GLU 92 12/26/2018    BUN 12 12/26/2018    BUNCREA 10.4 12/26/2018    CREATSERUM 1.15 12/26/2018    ANIONGAP 8 12/26/2018    GFRNAA >60 12/26/2018    GFRAA >60 him from the ER. I recommend that he continue with ice and heat as much as tolerated and follow-up with me in 4 weeks. If he does not have sufficient improvement, we can consider lumbar epidural steroid injection.   In the meantime, patient will continue

## 2020-11-17 ENCOUNTER — TELEPHONE (OUTPATIENT)
Dept: INTERNAL MEDICINE CLINIC | Facility: CLINIC | Age: 20
End: 2020-11-17

## 2020-11-17 RX ORDER — DEXTROAMPHETAMINE SACCHARATE, AMPHETAMINE ASPARTATE, DEXTROAMPHETAMINE SULFATE AND AMPHETAMINE SULFATE 5; 5; 5; 5 MG/1; MG/1; MG/1; MG/1
20 TABLET ORAL 2 TIMES DAILY
Qty: 60 TABLET | Refills: 0 | Status: SHIPPED | OUTPATIENT
Start: 2020-11-17 | End: 2020-12-01

## 2020-11-17 NOTE — TELEPHONE ENCOUNTER
Dr Dajuan Laird,    Pt is requesting his return to work be 12/3/2020 instead of November 23. Do you support?      Thank you,    Mary Cowan

## 2020-11-17 NOTE — TELEPHONE ENCOUNTER
Patient calling for a refill on medication he is out of the medication         amphetamine-dextroamphetamine (ADDERALL) 20 MG Oral Tab    Please advise

## 2020-11-17 NOTE — TELEPHONE ENCOUNTER
Reviewed chart–this is fine–noted that he did see the physiatrist on 11/12/2020 and it was recommended that he do physical therapy with home exercises as well if not better he may need an injection

## 2020-11-19 NOTE — TELEPHONE ENCOUNTER
Dr. Mode Huerta,    **2 forms**     Please sign off on form: Disab and Fmla  -Highlight the patient and hit \"Chart\" button.   -In Chart Review, w/in the Encounter tab - click 1 time on the Telephone call encounter for 11/11/2020 Scroll down the telephone enco

## 2020-11-23 ENCOUNTER — TELEPHONE (OUTPATIENT)
Dept: CARDIOLOGY CLINIC | Facility: CLINIC | Age: 20
End: 2020-11-23

## 2020-11-23 NOTE — TELEPHONE ENCOUNTER
The [facility] paperwork has been received. Looks like it may be duplicate.  4 pages

## 2020-12-01 ENCOUNTER — OFFICE VISIT (OUTPATIENT)
Dept: INTERNAL MEDICINE CLINIC | Facility: CLINIC | Age: 20
End: 2020-12-01
Payer: COMMERCIAL

## 2020-12-01 VITALS
HEIGHT: 70 IN | DIASTOLIC BLOOD PRESSURE: 73 MMHG | RESPIRATION RATE: 17 BRPM | HEART RATE: 66 BPM | SYSTOLIC BLOOD PRESSURE: 107 MMHG | BODY MASS INDEX: 24.34 KG/M2 | WEIGHT: 170 LBS

## 2020-12-01 DIAGNOSIS — M54.16 ACUTE RIGHT LUMBAR RADICULOPATHY: Primary | ICD-10-CM

## 2020-12-01 DIAGNOSIS — F90.2 ATTENTION DEFICIT HYPERACTIVITY DISORDER (ADHD), COMBINED TYPE: ICD-10-CM

## 2020-12-01 PROCEDURE — 3008F BODY MASS INDEX DOCD: CPT | Performed by: INTERNAL MEDICINE

## 2020-12-01 PROCEDURE — 3078F DIAST BP <80 MM HG: CPT | Performed by: INTERNAL MEDICINE

## 2020-12-01 PROCEDURE — 99213 OFFICE O/P EST LOW 20 MIN: CPT | Performed by: INTERNAL MEDICINE

## 2020-12-01 PROCEDURE — 99212 OFFICE O/P EST SF 10 MIN: CPT | Performed by: INTERNAL MEDICINE

## 2020-12-01 PROCEDURE — 3074F SYST BP LT 130 MM HG: CPT | Performed by: INTERNAL MEDICINE

## 2020-12-01 RX ORDER — CLINDAMYCIN PHOSPHATE 10 MG/ML
SOLUTION TOPICAL
COMMUNITY
Start: 2020-11-29 | End: 2022-02-02

## 2020-12-01 RX ORDER — DEXTROAMPHETAMINE SACCHARATE, AMPHETAMINE ASPARTATE, DEXTROAMPHETAMINE SULFATE AND AMPHETAMINE SULFATE 5; 5; 5; 5 MG/1; MG/1; MG/1; MG/1
20 TABLET ORAL 2 TIMES DAILY
Qty: 60 TABLET | Refills: 0 | Status: SHIPPED | OUTPATIENT
Start: 2020-12-01 | End: 2020-12-31

## 2020-12-01 NOTE — PROGRESS NOTES
[facility] is a 23year old male. 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

## 2020-12-08 ENCOUNTER — TELEPHONE (OUTPATIENT)
Dept: NEUROLOGY | Facility: CLINIC | Age: 20
End: 2020-12-08

## 2020-12-08 ENCOUNTER — OFFICE VISIT (OUTPATIENT)
Dept: NEUROLOGY | Facility: CLINIC | Age: 20
End: 2020-12-08
Payer: COMMERCIAL

## 2020-12-08 DIAGNOSIS — M54.16 BILATERAL LUMBAR RADICULOPATHY: ICD-10-CM

## 2020-12-08 DIAGNOSIS — M51.26 HNP (HERNIATED NUCLEUS PULPOSUS), LUMBAR: ICD-10-CM

## 2020-12-08 DIAGNOSIS — M54.12 CERVICAL RADICULOPATHY: Primary | ICD-10-CM

## 2020-12-08 PROCEDURE — 99214 OFFICE O/P EST MOD 30 MIN: CPT | Performed by: PHYSICAL MEDICINE & REHABILITATION

## 2020-12-08 RX ORDER — MELOXICAM 15 MG/1
15 TABLET ORAL DAILY
Qty: 14 TABLET | Refills: 0 | Status: SHIPPED | OUTPATIENT
Start: 2020-12-08 | End: 2020-12-16 | Stop reason: ALTCHOICE

## 2020-12-08 NOTE — PATIENT INSTRUCTIONS
-Start PT for cervical spine and continue PT for lumbar spine  -Start Mobic daily for the next 2 weeks  -Ice/heat 2-3x daily  -My office will call for epidural injection once approved  -Follow up 3 weeks after injection

## 2020-12-08 NOTE — TELEPHONE ENCOUNTER
Contacted [facility] online Case/Reference # [id number] to initiate authorization for bilateral S1 TFESIs CPT [id number] dx:M54.16 to be done at [facility].     Status: Pending review  Awaiting determination

## 2020-12-08 NOTE — PROGRESS NOTES
[address] [facility]  FOLLOW UP EVALUATION    Chief Complaint: back pain.     HISTORY OF PRESENT ILLNESS:   Patient presents with:  Low Back Pain: LOV 11/12/20 f/u for right sided low back pain radiating down the right chronic granulomatous disease who presents with low back pain. Patient was dead lifting and October 30 when he felt a twinge in his low back.   He did not think much of it however over the next several days his pain became more severe with sharp shooting p Allergies      FAMILY HISTORY:     Family History   Problem Relation Age of Onset   • Other (Other[other]) Brother         CGD   • Other (Other[other]) Other    • Glaucoma Neg    • Arrhythmia Neg    • Diabetes Neg    • Heart Disorder Neg    • Hypertension bilaterally, rotation bilaterally, flexion, and extension   Strength: 5/5  Sensation: Intact to light touch in all dermatomes of the bilateral upper extremities  Reflexes: 2/4 at C5, C6, C7  Spurling Test: Positive for radicular symptoms down either extrem 1. L5-S1:  Disc degeneration with small central disc herniation. Mild peripheral narrowing. No significant central narrowing. 2. Distal cord is not included in the field of view. Conus medullaris is not well seen as at terminates behind T12.     All i

## 2020-12-10 NOTE — TELEPHONE ENCOUNTER
Dr. Juliano Hopkins,     Please sign off on the 11/11/20 link with no name   I forgot to add your name and date scanned. (sorry) and will fix after sign off. Please sign off on form:  -Highlight the patient and hit \"Chart\" button.   -In Chart Review, w/in the

## 2020-12-14 NOTE — TELEPHONE ENCOUNTER
Called [facility] for status on bilateral S1 TFESIs with tracking # [id number]. Spoke to Zofia 12 who states request was not submitted as a bilateral request.   Update made, modification updates can take up to 48 hours.    Called: [phone] for update  Reference

## 2020-12-15 ENCOUNTER — TELEPHONE (OUTPATIENT)
Dept: NEUROLOGY | Facility: CLINIC | Age: 20
End: 2020-12-15

## 2020-12-15 ENCOUNTER — OFFICE VISIT (OUTPATIENT)
Dept: NEUROLOGY | Facility: CLINIC | Age: 20
End: 2020-12-15
Payer: COMMERCIAL

## 2020-12-15 VITALS — BODY MASS INDEX: 24.5 KG/M2 | WEIGHT: 175 LBS | HEIGHT: 71 IN

## 2020-12-15 DIAGNOSIS — M54.16 ACUTE RIGHT LUMBAR RADICULOPATHY: ICD-10-CM

## 2020-12-15 DIAGNOSIS — M54.12 CERVICAL RADICULOPATHY: Primary | ICD-10-CM

## 2020-12-15 DIAGNOSIS — M51.26 HNP (HERNIATED NUCLEUS PULPOSUS), LUMBAR: ICD-10-CM

## 2020-12-15 DIAGNOSIS — M54.16 BILATERAL LUMBAR RADICULOPATHY: ICD-10-CM

## 2020-12-15 PROCEDURE — 3008F BODY MASS INDEX DOCD: CPT | Performed by: PHYSICAL MEDICINE & REHABILITATION

## 2020-12-15 PROCEDURE — 99214 OFFICE O/P EST MOD 30 MIN: CPT | Performed by: PHYSICAL MEDICINE & REHABILITATION

## 2020-12-15 RX ORDER — AMITRIPTYLINE HYDROCHLORIDE 10 MG/1
10 TABLET, FILM COATED ORAL NIGHTLY
Qty: 30 TABLET | Refills: 0 | Status: SHIPPED | OUTPATIENT
Start: 2020-12-15 | End: 2020-12-23 | Stop reason: ALTCHOICE

## 2020-12-15 NOTE — TELEPHONE ENCOUNTER
The [facility] Online for authorization of approval for MRI C-spine wo cpt code 64492. Approval was given with Authorization Number: [id number] effective 12/15/20 to 01/29/21. Will call Pt. to inform. L/m advising of approval. Can proceed with scheduling appt.

## 2020-12-16 ENCOUNTER — VIRTUAL PHONE E/M (OUTPATIENT)
Dept: INTERNAL MEDICINE CLINIC | Facility: CLINIC | Age: 20
End: 2020-12-16
Payer: COMMERCIAL

## 2020-12-16 ENCOUNTER — MED REC SCAN ONLY (OUTPATIENT)
Dept: INTERNAL MEDICINE CLINIC | Facility: CLINIC | Age: 20
End: 2020-12-16

## 2020-12-16 DIAGNOSIS — H93.12 TINNITUS OF LEFT EAR: Primary | ICD-10-CM

## 2020-12-16 PROCEDURE — 99213 OFFICE O/P EST LOW 20 MIN: CPT | Performed by: INTERNAL MEDICINE

## 2020-12-16 NOTE — PROGRESS NOTES
[address]  FOLLOW UP EVALUATION    Chief Complaint: back pain. HISTORY OF PRESENT ILLNESS:   Patient presents with:  Low Back Pain: Patient hear ringing in left ear and having vibrating.  He would like to back.  However his pain in the low back is persistent as well and he continues to experience radiating symptoms in the right lower extremity. Recently he is started experiencing some numbness tingling sensation on the sole of his left foot as well.   His p granulomatous disease (CGD) of [facility] ([facility]    • Vesico-ureteral reflux 12/05/2006         PAST SURGICAL HISTORY:   History reviewed. No pertinent surgical history.       CURRENT MEDICATIONS:     Current Outpatient Medications   Medication Sig Dispense R Normocephalic/ Atraumatic  Eyes: Extra-occular movements intact. Ears: No auricular hematoma or deformities  Mouth: No lesions or ulcerations  Heart: peripheral pulses intact. Normal capillary refill.    Lungs: Non-labored respirations  Abdomen: No abdomi 12/26/2018    HGB 15.7 12/26/2018    HCT 45.7 12/26/2018    MCV 84.7 12/26/2018    MCH 29.2 12/26/2018    MCHC 34.5 12/26/2018    RDW 13.5 12/26/2018     12/26/2018    MPV 7.4 12/26/2018     Lab Results   Component Value Date    GLU 92 12/26/2018 additionally, I have recommended that he start amitriptyline at nighttime to help with neuropathic pain and insomnia. The patient verbalized understanding with the plan and was in agreement.  All questions/concerns were addressed and there were no petey

## 2020-12-16 NOTE — TELEPHONE ENCOUNTER
Received Approval from [facility] for Bilateral S1 TFESIs with authorization # [id number] effective 12/8/20-3/8/2021 at [address] to clinical staff to schedule injection at [facility]

## 2020-12-16 NOTE — PROGRESS NOTES
Telemedicine Visit for Respiratory Illness - Potential COVID-19 Infection    Virtual/Telephone Check-In    Leland Marquez verbally consents to a Telephone Check-In service on 12/16/20.  Patient understands and accepts financial responsibility for any deducti alert and oriented x3, no acute distress  Patient speaking in complete sentences without any conversational dyspnea or respiratory distress  No coughing heard    Summary of topics discussed/ diagnosis:  1.  Tinnitus of left ear  - ENT - INTERNAL    Reassure

## 2020-12-23 ENCOUNTER — HOSPITAL ENCOUNTER (OUTPATIENT)
Dept: MRI IMAGING | Facility: HOSPITAL | Age: 20
Discharge: HOME | End: 2020-12-23
Attending: PHYSICAL MEDICINE & REHABILITATION
Payer: COMMERCIAL

## 2020-12-23 DIAGNOSIS — M54.12 CERVICAL RADICULOPATHY: ICD-10-CM

## 2020-12-23 PROCEDURE — 72141 MRI NECK SPINE W/O DYE: CPT | Performed by: PHYSICAL MEDICINE & REHABILITATION

## 2020-12-23 NOTE — PROGRESS NOTES
Telemedicine Visit for Respiratory Illness   Virtual/Telephone Check-In    Shahrzad Olmos verbally consents to a Telephone Check-In service on 12/23/20.  Patient understands and accepts financial responsibility for any deductible, co-insurance and/or co-pays Cervical radiculopathy    Current Outpatient Medications   Medication Sig Dispense Refill   • ALPRAZolam 0.25 MG Oral Tab Take 1 tablet (0.25 mg total) by mouth daily as needed for Anxiety.  10 tablet 0   • Clindamycin Phosphate 1 % External Swab      • amp in the plan of care above. Patient also advised to follow CDC guidelines for self isolation/social distancing and symptomatic treatment as outlined on CDC Patient Guidelines.   Maria Eugenia Seals MD

## 2020-12-28 ENCOUNTER — OFFICE VISIT (OUTPATIENT)
Dept: NEUROLOGY | Facility: CLINIC | Age: 20
End: 2020-12-28
Payer: COMMERCIAL

## 2020-12-28 ENCOUNTER — TELEPHONE (OUTPATIENT)
Dept: NEUROLOGY | Facility: CLINIC | Age: 20
End: 2020-12-28

## 2020-12-28 VITALS — BODY MASS INDEX: 24.5 KG/M2 | WEIGHT: 175 LBS | HEIGHT: 71 IN

## 2020-12-28 DIAGNOSIS — F41.9 ANXIETY: ICD-10-CM

## 2020-12-28 DIAGNOSIS — M51.26 HNP (HERNIATED NUCLEUS PULPOSUS), LUMBAR: ICD-10-CM

## 2020-12-28 DIAGNOSIS — M54.12 CERVICAL RADICULOPATHY: Primary | ICD-10-CM

## 2020-12-28 DIAGNOSIS — D71 CHRONIC GRANULOMATOUS DISEASE (HCC): ICD-10-CM

## 2020-12-28 DIAGNOSIS — M54.16 BILATERAL LUMBAR RADICULOPATHY: ICD-10-CM

## 2020-12-28 PROCEDURE — 99214 OFFICE O/P EST MOD 30 MIN: CPT | Performed by: PHYSICAL MEDICINE & REHABILITATION

## 2020-12-28 PROCEDURE — 3008F BODY MASS INDEX DOCD: CPT | Performed by: PHYSICAL MEDICINE & REHABILITATION

## 2020-12-28 RX ORDER — DULOXETIN HYDROCHLORIDE 30 MG/1
30 CAPSULE, DELAYED RELEASE ORAL DAILY
Qty: 30 CAPSULE | Refills: 0 | Status: SHIPPED | OUTPATIENT
Start: 2020-12-28 | End: 2021-01-04 | Stop reason: ALTCHOICE

## 2020-12-28 NOTE — PATIENT INSTRUCTIONS
-Cymbalta to be started daily  -Return back to work full duty with restrictions of no heavy lifting greater than 20 lbs  -Follow up in 4 weeks

## 2020-12-28 NOTE — PROGRESS NOTES
[address]  FOLLOW UP EVALUATION    Chief Complaint: back pain. HISTORY OF PRESENT ILLNESS:   Patient presents with:  Imaging: LOV: 12/15/2020 Mri cervical spine. PT 2 days a week.  Not taking amirtiptylin Specifically, he is having a difficult time sleeping at nighttime as result of his anxiety and pain. 12/8/20  Patient is here for follow-up evaluation of low back pain and new complaint of right-sided neck pain with radiation in the right arm.   Since la imaging of the lumbar spine. He has been taking a Medrol Dosepak and was prescribed prednisone as well. 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   He has noted improvement since the onset of his symp Disorder Neg    • Hypertension Neg           SOCIAL HISTORY:   Social History    Tobacco Use      Smoking status: Never Smoker      Smokeless tobacco: Never Used      Tobacco comment: +marijuana use    Alcohol use: No      Alcohol/week: 0.0 standard drinks radicular symptoms down either extremity bilaterally  Adson's Test: negative for reproducible symptoms    LUMBAR SPINE:  Inspection: no erythema, swelling, or obvious deformity.   Their iliac crest and shoulder heights are symmetrical.     Palpation: Non te Lesser incidental findings as above. MRI LUMBAR SPINE dated November 10, 2020 revealed:   CONCLUSION:   1. L5-S1:  Disc degeneration with small central disc herniation. Mild peripheral narrowing. No significant central narrowing.   2. Distal cord is

## 2020-12-29 NOTE — TELEPHONE ENCOUNTER
Pt will be sending additional disab forms. Informed him again of 10-15 business day turn around time.

## 2020-12-29 NOTE — TELEPHONE ENCOUNTER
OV 12/28/20: If continues to have pain limiting his activity, we will consider an epidural injection for the lumbar spine.

## 2020-12-30 ENCOUNTER — TELEPHONE (OUTPATIENT)
Dept: NEUROLOGY | Facility: CLINIC | Age: 20
End: 2020-12-30

## 2021-01-04 ENCOUNTER — TELEPHONE (OUTPATIENT)
Dept: NEUROLOGY | Facility: CLINIC | Age: 21
End: 2021-01-04

## 2021-01-04 ENCOUNTER — VIRTUAL PHONE E/M (OUTPATIENT)
Dept: INTERNAL MEDICINE CLINIC | Facility: CLINIC | Age: 21
End: 2021-01-04

## 2021-01-04 DIAGNOSIS — M54.50 CHRONIC MIDLINE LOW BACK PAIN WITHOUT SCIATICA: ICD-10-CM

## 2021-01-04 DIAGNOSIS — F41.9 ANXIETY: ICD-10-CM

## 2021-01-04 DIAGNOSIS — H93.13 TINNITUS OF BOTH EARS: Primary | ICD-10-CM

## 2021-01-04 DIAGNOSIS — G89.29 CHRONIC MIDLINE LOW BACK PAIN WITHOUT SCIATICA: ICD-10-CM

## 2021-01-04 DIAGNOSIS — H04.123 DRY EYES: ICD-10-CM

## 2021-01-04 DIAGNOSIS — G62.9 NEUROPATHY: ICD-10-CM

## 2021-01-04 PROCEDURE — 99213 OFFICE O/P EST LOW 20 MIN: CPT | Performed by: INTERNAL MEDICINE

## 2021-01-04 RX ORDER — ALPRAZOLAM 0.25 MG/1
0.25 TABLET ORAL DAILY PRN
Qty: 10 TABLET | Refills: 0 | Status: SHIPPED | OUTPATIENT
Start: 2021-01-04 | End: 2021-03-30

## 2021-01-04 NOTE — PROGRESS NOTES
Telemedicine Visit - Potential COVID-19 Infection    Virtual/Telephone Check-In    Magnolia Ahumada verbally consents to a Telephone Check-In service on 01/04/21.  Patient understands and accepts financial responsibility for any deductible, co-insurance and/or No [x]       • Contact with COVID19:     PUI  []     Confirmed []     No contact with confirmed COVID19 [x]       • Occupation or large gatherings:      Patient Active Problem List:     Attention deficit disorder     Chronic granulomatous disease ([facility] milka to provide continuity of care in the best interest of the provider-patient relationship, due to the ongoing public health crisis/national emergency and because of restrictions of visitation.   There are limitations of this visit as no physical exam co

## 2021-01-04 NOTE — TELEPHONE ENCOUNTER
Patient called and scheduled him for his Bilateral S1 lumbar TFESI under fluoroscopy guidance under local anesthesia for 1/11/2021.

## 2021-01-11 RX ORDER — AMITRIPTYLINE HYDROCHLORIDE 10 MG/1
TABLET, FILM COATED ORAL
Qty: 30 TABLET | Refills: 0 | OUTPATIENT
Start: 2021-01-11

## 2021-01-11 NOTE — TELEPHONE ENCOUNTER
Refill request for AMITRIPTYLINE HCL 10 MG Oral Tab Take one tablet by mouth nightly. 30 tablet with 0 refills.     LOV: 12/28/20  NOV: 1/11/21    Last refilled: 12/15/20 (Discontinued)

## 2021-01-14 ENCOUNTER — TELEPHONE (OUTPATIENT)
Dept: NEUROLOGY | Facility: CLINIC | Age: 21
End: 2021-01-14

## 2021-01-14 NOTE — TELEPHONE ENCOUNTER
Left detailed message for patient asking how he was feeling after injection. Asked him to call the office if he had any questions worsening symptoms or concerns. Reminded him to call the office to set up virtual follow up visit after injection.

## 2021-01-14 NOTE — TELEPHONE ENCOUNTER
Faxed updated notes from Dr. Manuel Russell and Dr. Young Villa to [facility] [phone] with Claim #: [id number].

## 2021-01-18 RX ORDER — DEXTROAMPHETAMINE SACCHARATE, AMPHETAMINE ASPARTATE, DEXTROAMPHETAMINE SULFATE AND AMPHETAMINE SULFATE 5; 5; 5; 5 MG/1; MG/1; MG/1; MG/1
20 TABLET ORAL 2 TIMES DAILY
Qty: 60 TABLET | Refills: 0 | Status: SHIPPED | OUTPATIENT
Start: 2021-01-18 | End: 2021-02-17

## 2021-01-18 RX ORDER — DEXTROAMPHETAMINE SACCHARATE, AMPHETAMINE ASPARTATE, DEXTROAMPHETAMINE SULFATE AND AMPHETAMINE SULFATE 5; 5; 5; 5 MG/1; MG/1; MG/1; MG/1
20 TABLET ORAL 2 TIMES DAILY
Qty: 60 TABLET | Refills: 0 | Status: SHIPPED | OUTPATIENT
Start: 2021-02-18 | End: 2021-03-20

## 2021-01-18 RX ORDER — DEXTROAMPHETAMINE SACCHARATE, AMPHETAMINE ASPARTATE, DEXTROAMPHETAMINE SULFATE AND AMPHETAMINE SULFATE 5; 5; 5; 5 MG/1; MG/1; MG/1; MG/1
20 TABLET ORAL 2 TIMES DAILY
Qty: 60 TABLET | Refills: 0 | Status: SHIPPED | OUTPATIENT
Start: 2021-03-21 | End: 2021-04-19

## 2021-01-18 NOTE — TELEPHONE ENCOUNTER
Patient is requesting a refill for amphetamine-dextroamphetamine (ADDERALL) 20 MG Oral Tab     He has none left and wants to pick it up today.

## 2021-02-01 ENCOUNTER — TELEPHONE (OUTPATIENT)
Dept: INTERNAL MEDICINE CLINIC | Facility: CLINIC | Age: 21
End: 2021-02-01

## 2021-02-01 RX ORDER — DULOXETIN HYDROCHLORIDE 30 MG/1
CAPSULE, DELAYED RELEASE ORAL
Qty: 30 CAPSULE | Refills: 0 | OUTPATIENT
Start: 2021-02-01

## 2021-02-01 NOTE — TELEPHONE ENCOUNTER
Patient states he saw a neurologist and all of his symptoms were COVID 19 related. Patient states his symptoms are much more mild and he can tolerate them. He is back at work.     Patient states he does not need a refill on duloxetine and he is no longer ta

## 2021-02-01 NOTE — TELEPHONE ENCOUNTER
Patient states he need a return to work note with no restrictions. Can it be put in his my chart if possible can it be done today.

## 2021-03-12 ENCOUNTER — MED REC SCAN ONLY (OUTPATIENT)
Dept: INTERNAL MEDICINE CLINIC | Facility: CLINIC | Age: 21
End: 2021-03-12

## 2021-03-23 ENCOUNTER — TELEPHONE (OUTPATIENT)
Dept: ADMINISTRATIVE | Age: 21
End: 2021-03-23

## 2021-03-25 NOTE — TELEPHONE ENCOUNTER
Sent patient [facility] message that form cannot be completed until his appt 3/29 and physician approves.  Tried to reach by phone but rings and then busy signal.

## 2021-03-29 ENCOUNTER — TELEPHONE (OUTPATIENT)
Dept: INTERNAL MEDICINE CLINIC | Facility: CLINIC | Age: 21
End: 2021-03-29

## 2021-03-29 NOTE — TELEPHONE ENCOUNTER
Patient calling checking on  status of another appt.   Very apologetic for missing the appt early today   \"I have a lot to discuss with Dr. Aldair Villegas \"     Best call back number: [phone]

## 2021-03-29 NOTE — TELEPHONE ENCOUNTER
Dr Neeraj Garcia  The patient called to apologize for missing his appointment this morning. 'it was totally my fault, I set the alarm wrong'. 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  He is asking for another

## 2021-03-29 NOTE — TELEPHONE ENCOUNTER
Patient called asking if we rec'd an APS and that he missed his appt. Confirmed we did receive APS and explained again we cannot completed form until after his appt.

## 2021-03-30 ENCOUNTER — LAB ENCOUNTER (OUTPATIENT)
Dept: LAB | Age: 21
End: 2021-03-30
Attending: INTERNAL MEDICINE
Payer: COMMERCIAL

## 2021-03-30 ENCOUNTER — OFFICE VISIT (OUTPATIENT)
Dept: INTERNAL MEDICINE CLINIC | Facility: CLINIC | Age: 21
End: 2021-03-30
Payer: COMMERCIAL

## 2021-03-30 VITALS
HEART RATE: 75 BPM | WEIGHT: 170 LBS | SYSTOLIC BLOOD PRESSURE: 137 MMHG | BODY MASS INDEX: 23.8 KG/M2 | DIASTOLIC BLOOD PRESSURE: 80 MMHG | HEIGHT: 71 IN

## 2021-03-30 DIAGNOSIS — F12.90 CANNABIS USE, UNCOMPLICATED: ICD-10-CM

## 2021-03-30 DIAGNOSIS — F41.9 ANXIETY: ICD-10-CM

## 2021-03-30 DIAGNOSIS — G62.9 NEUROPATHY: Primary | ICD-10-CM

## 2021-03-30 PROCEDURE — 3008F BODY MASS INDEX DOCD: CPT | Performed by: INTERNAL MEDICINE

## 2021-03-30 PROCEDURE — 3075F SYST BP GE 130 - 139MM HG: CPT | Performed by: INTERNAL MEDICINE

## 2021-03-30 PROCEDURE — 80349 CANNABINOIDS NATURAL: CPT

## 2021-03-30 PROCEDURE — 3079F DIAST BP 80-89 MM HG: CPT | Performed by: INTERNAL MEDICINE

## 2021-03-30 PROCEDURE — 99214 OFFICE O/P EST MOD 30 MIN: CPT | Performed by: INTERNAL MEDICINE

## 2021-03-30 PROCEDURE — 80307 DRUG TEST PRSMV CHEM ANLYZR: CPT

## 2021-03-30 RX ORDER — ALPRAZOLAM 0.25 MG/1
0.25 TABLET ORAL DAILY PRN
Qty: 10 TABLET | Refills: 0 | Status: SHIPPED | OUTPATIENT
Start: 2021-03-30

## 2021-03-30 NOTE — PROGRESS NOTES
Travon Medina is a 23year old male. Patient presents with: Follow - Up: Pt reports to office for follow-up visit, and short-term disabilty follow-up, and to discuss  ADA accomodations.        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 • itraconazole (SPORANOX) 100 MG Oral Cap Take by mouth daily.           Past Medical History:   Diagnosis Date   • Chronic granulomatous disease (CGD) of childhood ([facility]    • Molina Allred CANNABINOID/MARIJUANA SCREEN,URINE;  Future  -     CANNABINOID/MARIJUANA SCREEN/REFLEX; Future    wean off marajuana     Noted that labs were done in the ER and this has been reviewed-kidneys and  electrolytes were within normal limits       The patient ind

## 2021-03-31 NOTE — TELEPHONE ENCOUNTER
Pt left a voicemail on 3/30/2021 for Brenda,pt would like to let her know that he did have an appt with Dr Tran Villegas on 3/30/2021.

## 2021-04-01 NOTE — TELEPHONE ENCOUNTER
,    I have attached 2 forms that require your signature. Please sign off on form:  -Highlight the patient and hit \"Chart\" button.   -In Chart Review, w/in the Encounter tab - click 1 time on the Telephone call encounter for 3/23/21 Scroll d

## 2021-04-16 ENCOUNTER — PATIENT MESSAGE (OUTPATIENT)
Dept: INTERNAL MEDICINE CLINIC | Facility: CLINIC | Age: 21
End: 2021-04-16

## 2021-04-18 NOTE — TELEPHONE ENCOUNTER
LOV 3/30/21. Please send to Dr. Mary Sue when Pt confirms dose. Thanks      From: Mily Hilliard  To: Zahra Tejada MD  Sent: 4/16/2021  3:36 PM CDT  Subject: Prescription Question    Please refill my adderall!  Thanks      amphetamine-dextroamphetamine (AD

## 2021-04-19 RX ORDER — DEXTROAMPHETAMINE SACCHARATE, AMPHETAMINE ASPARTATE, DEXTROAMPHETAMINE SULFATE AND AMPHETAMINE SULFATE 5; 5; 5; 5 MG/1; MG/1; MG/1; MG/1
20 TABLET ORAL 2 TIMES DAILY
Qty: 60 TABLET | Refills: 0 | Status: SHIPPED | OUTPATIENT
Start: 2021-04-19 | End: 2021-05-20

## 2021-05-20 RX ORDER — DEXTROAMPHETAMINE SACCHARATE, AMPHETAMINE ASPARTATE, DEXTROAMPHETAMINE SULFATE AND AMPHETAMINE SULFATE 5; 5; 5; 5 MG/1; MG/1; MG/1; MG/1
20 TABLET ORAL 2 TIMES DAILY
Qty: 60 TABLET | Refills: 0 | Status: SHIPPED | OUTPATIENT
Start: 2021-05-20 | End: 2021-06-19

## 2021-05-20 NOTE — TELEPHONE ENCOUNTER
Patient is requesting a refill and is out of the medication.      amphetamine-dextroamphetamine (ADDERALL) 20 MG Oral Tab

## 2021-06-21 ENCOUNTER — TELEPHONE (OUTPATIENT)
Dept: INTERNAL MEDICINE CLINIC | Facility: CLINIC | Age: 21
End: 2021-06-21

## 2021-06-21 RX ORDER — DEXTROAMPHETAMINE SACCHARATE, AMPHETAMINE ASPARTATE, DEXTROAMPHETAMINE SULFATE AND AMPHETAMINE SULFATE 5; 5; 5; 5 MG/1; MG/1; MG/1; MG/1
20 TABLET ORAL 2 TIMES DAILY
Qty: 60 TABLET | Refills: 0 | Status: SHIPPED | OUTPATIENT
Start: 2021-06-21 | End: 2021-07-21

## 2021-06-21 RX ORDER — DEXTROAMPHETAMINE SACCHARATE, AMPHETAMINE ASPARTATE, DEXTROAMPHETAMINE SULFATE AND AMPHETAMINE SULFATE 5; 5; 5; 5 MG/1; MG/1; MG/1; MG/1
20 TABLET ORAL 2 TIMES DAILY
Qty: 60 TABLET | Refills: 0 | Status: SHIPPED | OUTPATIENT
Start: 2021-08-22 | End: 2021-09-21

## 2021-06-21 RX ORDER — DEXTROAMPHETAMINE SACCHARATE, AMPHETAMINE ASPARTATE, DEXTROAMPHETAMINE SULFATE AND AMPHETAMINE SULFATE 5; 5; 5; 5 MG/1; MG/1; MG/1; MG/1
20 TABLET ORAL 2 TIMES DAILY
Qty: 60 TABLET | Refills: 0 | Status: SHIPPED | OUTPATIENT
Start: 2021-07-22 | End: 2021-08-20

## 2021-06-21 NOTE — TELEPHONE ENCOUNTER
Pt called requesting if he can have refill on  amphetamine-dextroamphetamine (ADDERALL) 20 MG Oral Tab    States he is completley out. Found under Discontinued meds   Please advise.

## 2021-07-12 ENCOUNTER — MED REC SCAN ONLY (OUTPATIENT)
Dept: NEUROLOGY | Facility: CLINIC | Age: 21
End: 2021-07-12

## 2021-08-20 RX ORDER — DEXTROAMPHETAMINE SACCHARATE, AMPHETAMINE ASPARTATE, DEXTROAMPHETAMINE SULFATE AND AMPHETAMINE SULFATE 5; 5; 5; 5 MG/1; MG/1; MG/1; MG/1
20 TABLET ORAL 2 TIMES DAILY
Qty: 60 TABLET | Refills: 0 | Status: SHIPPED | OUTPATIENT
Start: 2021-08-20 | End: 2021-09-22

## 2021-08-20 NOTE — TELEPHONE ENCOUNTER
Pt calling requsting refill for amphetamine-dextroamphetamine (ADDERALL) 20 MG Oral Tab     States he is out   Please advise.

## 2021-09-22 RX ORDER — DEXTROAMPHETAMINE SACCHARATE, AMPHETAMINE ASPARTATE, DEXTROAMPHETAMINE SULFATE AND AMPHETAMINE SULFATE 5; 5; 5; 5 MG/1; MG/1; MG/1; MG/1
20 TABLET ORAL 2 TIMES DAILY
Qty: 60 TABLET | Refills: 0 | Status: SHIPPED | OUTPATIENT
Start: 2021-09-22 | End: 2021-10-22

## 2021-09-22 NOTE — TELEPHONE ENCOUNTER
Patient is requesting refill on:    Amphetamine-dextroamphetamine  20 mg     [id number]  [address]  [address]

## 2021-10-28 ENCOUNTER — TELEPHONE (OUTPATIENT)
Dept: INTERNAL MEDICINE CLINIC | Facility: CLINIC | Age: 21
End: 2021-10-28

## 2021-10-28 NOTE — TELEPHONE ENCOUNTER
amphetamine-dextroamphetamine (ADDERALL) 20 MG Oral Tab     Pt calling requesting refill asap. He is out. Please advise.

## 2021-10-29 RX ORDER — DEXTROAMPHETAMINE SACCHARATE, AMPHETAMINE ASPARTATE, DEXTROAMPHETAMINE SULFATE AND AMPHETAMINE SULFATE 5; 5; 5; 5 MG/1; MG/1; MG/1; MG/1
20 TABLET ORAL 2 TIMES DAILY
Qty: 60 TABLET | Refills: 0 | Status: SHIPPED | OUTPATIENT
Start: 2021-11-28 | End: 2021-12-28

## 2021-10-29 RX ORDER — DEXTROAMPHETAMINE SACCHARATE, AMPHETAMINE ASPARTATE, DEXTROAMPHETAMINE SULFATE AND AMPHETAMINE SULFATE 5; 5; 5; 5 MG/1; MG/1; MG/1; MG/1
20 TABLET ORAL 2 TIMES DAILY
Qty: 60 TABLET | Refills: 0 | Status: SHIPPED | OUTPATIENT
Start: 2021-12-29 | End: 2022-02-01

## 2021-10-29 RX ORDER — DEXTROAMPHETAMINE SACCHARATE, AMPHETAMINE ASPARTATE, DEXTROAMPHETAMINE SULFATE AND AMPHETAMINE SULFATE 5; 5; 5; 5 MG/1; MG/1; MG/1; MG/1
20 TABLET ORAL 2 TIMES DAILY
Qty: 60 TABLET | Refills: 0 | Status: SHIPPED | OUTPATIENT
Start: 2021-10-29 | End: 2021-11-28

## 2021-12-19 LAB — AMB EXT COVID-19 RESULT: DETECTED

## 2021-12-20 ENCOUNTER — VIRTUAL PHONE E/M (OUTPATIENT)
Dept: INTERNAL MEDICINE CLINIC | Facility: CLINIC | Age: 21
End: 2021-12-20

## 2021-12-20 ENCOUNTER — NURSE TRIAGE (OUTPATIENT)
Dept: INTERNAL MEDICINE CLINIC | Facility: CLINIC | Age: 21
End: 2021-12-20

## 2021-12-20 DIAGNOSIS — U07.1 ACUTE COVID-19: Primary | ICD-10-CM

## 2021-12-20 PROCEDURE — 99213 OFFICE O/P EST LOW 20 MIN: CPT | Performed by: INTERNAL MEDICINE

## 2021-12-20 NOTE — PATIENT INSTRUCTIONS
Please isolate at home, perform hand hygiene frequently and mask appropriately.   You will no longer be contagious to others once 10 days have passed since your symptoms started, assuming symptoms are improving and you have not had a fever for at least 24 h

## 2021-12-20 NOTE — TELEPHONE ENCOUNTER
Patient stated that he started with fever of  F on Saturday 12/18/2021 and since yesterday has no sense of smell. No other symptoms. Stated that had COVID last year. Patient is vaccinated.  Took a home COVID test yesterday and it was positive(chart u

## 2021-12-20 NOTE — PROGRESS NOTES
Virtual Telephone Check-In    Zoe verbally consents to a Virtual/Telephone Check-In visit on 12/20/21. Patient has been referred to the [facility] website at [email or web address] to review the yearly Consent to Treat document.     Patient Daniele Olvera daily.       • itraconazole 100 MG Oral Cap Take by mouth daily.        • Clindamycin Phosphate 1 % External Swab        No Known Allergies   Past Medical History:   Diagnosis Date   • Chronic granulomatous disease (CGD) of childhood ([facility]    • Vesico-urete

## 2022-02-01 RX ORDER — DEXTROAMPHETAMINE SACCHARATE, AMPHETAMINE ASPARTATE, DEXTROAMPHETAMINE SULFATE AND AMPHETAMINE SULFATE 5; 5; 5; 5 MG/1; MG/1; MG/1; MG/1
20 TABLET ORAL 2 TIMES DAILY
Qty: 60 TABLET | Refills: 0 | Status: SHIPPED | OUTPATIENT
Start: 2022-02-01 | End: 2022-02-02

## 2022-02-01 NOTE — TELEPHONE ENCOUNTER
Please review. Protocol Failed or has no Protocol  Requested Prescriptions   Pending Prescriptions Disp Refills    amphetamine-dextroamphetamine (ADDERALL) 20 MG Oral Tab 60 tablet 0     Sig: Take 1 tablet (20 mg total) by mouth 2 (two) times daily.         There is no refill protocol information for this order         Future Appointments         Provider Department Appt Notes    Tomorrow [facility]anc[facility]   policy informed          Recent Outpatient Visits              1 month ago Acute COVID-19    Valarie Brizuela MD    [facility]    10 months ago Tanesha Beckford MD    Office Visit    1 year ago [facility] [place] Dye, Marta Canchola MD    [facility]    1 year ago Cervical radiculopathy    [facility] [address] Burgess DO    Office Visit    1 year ago Saran Walter, Marta Canchola MD    [facility]

## 2022-02-01 NOTE — TELEPHONE ENCOUNTER
Patient is requesting a refill for amphetamine-dextroamphetamine (ADDERALL) 20 MG Oral Tab. Please advise. Patient is out of medication.  Appointment is scheduled for 2/2

## 2022-02-02 ENCOUNTER — OFFICE VISIT (OUTPATIENT)
Dept: INTERNAL MEDICINE CLINIC | Facility: CLINIC | Age: 22
End: 2022-02-02
Payer: COMMERCIAL

## 2022-02-02 VITALS
BODY MASS INDEX: 26.88 KG/M2 | WEIGHT: 192 LBS | RESPIRATION RATE: 16 BRPM | SYSTOLIC BLOOD PRESSURE: 131 MMHG | HEART RATE: 67 BPM | DIASTOLIC BLOOD PRESSURE: 85 MMHG | HEIGHT: 71 IN

## 2022-02-02 DIAGNOSIS — Z00.00 PHYSICAL EXAM, ANNUAL: Primary | ICD-10-CM

## 2022-02-02 DIAGNOSIS — F90.2 ATTENTION DEFICIT HYPERACTIVITY DISORDER (ADHD), COMBINED TYPE: ICD-10-CM

## 2022-02-02 DIAGNOSIS — F41.9 ANXIETY: ICD-10-CM

## 2022-02-02 DIAGNOSIS — E67.3 HYPERVITAMINOSIS D: ICD-10-CM

## 2022-02-02 PROCEDURE — 99395 PREV VISIT EST AGE 18-39: CPT | Performed by: INTERNAL MEDICINE

## 2022-02-02 PROCEDURE — 3075F SYST BP GE 130 - 139MM HG: CPT | Performed by: INTERNAL MEDICINE

## 2022-02-02 PROCEDURE — 3079F DIAST BP 80-89 MM HG: CPT | Performed by: INTERNAL MEDICINE

## 2022-02-02 PROCEDURE — 3008F BODY MASS INDEX DOCD: CPT | Performed by: INTERNAL MEDICINE

## 2022-02-02 RX ORDER — ALPRAZOLAM 0.25 MG/1
0.25 TABLET ORAL DAILY PRN
Qty: 10 TABLET | Refills: 0 | Status: CANCELLED | OUTPATIENT
Start: 2022-02-02

## 2022-02-02 RX ORDER — DEXTROAMPHETAMINE SACCHARATE, AMPHETAMINE ASPARTATE, DEXTROAMPHETAMINE SULFATE AND AMPHETAMINE SULFATE 5; 5; 5; 5 MG/1; MG/1; MG/1; MG/1
20 TABLET ORAL 2 TIMES DAILY
Qty: 60 TABLET | Refills: 0 | Status: SHIPPED | OUTPATIENT
Start: 2022-02-02 | End: 2022-03-04

## 2022-03-07 ENCOUNTER — MED REC SCAN ONLY (OUTPATIENT)
Dept: INTERNAL MEDICINE CLINIC | Facility: CLINIC | Age: 22
End: 2022-03-07

## 2022-04-05 RX ORDER — DEXTROAMPHETAMINE SACCHARATE, AMPHETAMINE ASPARTATE, DEXTROAMPHETAMINE SULFATE AND AMPHETAMINE SULFATE 5; 5; 5; 5 MG/1; MG/1; MG/1; MG/1
20 TABLET ORAL 2 TIMES DAILY
Qty: 60 TABLET | Refills: 0 | Status: SHIPPED | OUTPATIENT
Start: 2022-04-05 | End: 2022-05-05

## 2022-04-05 RX ORDER — DEXTROAMPHETAMINE SACCHARATE, AMPHETAMINE ASPARTATE, DEXTROAMPHETAMINE SULFATE AND AMPHETAMINE SULFATE 5; 5; 5; 5 MG/1; MG/1; MG/1; MG/1
20 TABLET ORAL 2 TIMES DAILY
Qty: 60 TABLET | Refills: 0 | Status: SHIPPED | OUTPATIENT
Start: 2022-06-06 | End: 2022-07-06

## 2022-04-05 RX ORDER — DEXTROAMPHETAMINE SACCHARATE, AMPHETAMINE ASPARTATE, DEXTROAMPHETAMINE SULFATE AND AMPHETAMINE SULFATE 5; 5; 5; 5 MG/1; MG/1; MG/1; MG/1
20 TABLET ORAL 2 TIMES DAILY
Qty: 60 TABLET | Refills: 0 | Status: SHIPPED | OUTPATIENT
Start: 2022-05-06 | End: 2022-06-05

## 2022-04-05 NOTE — TELEPHONE ENCOUNTER
Please review. Protocol failed / No protocol. Pended as 90 day panel for provider consideration. Requested Prescriptions   Pending Prescriptions Disp Refills    amphetamine-dextroamphetamine (ADDERALL) 20 MG Oral Tab 60 tablet 0     Sig: Take 1 tablet (20 mg total) by mouth 2 (two) times daily. There is no refill protocol information for this order        amphetamine-dextroamphetamine (ADDERALL) 20 MG Oral Tab 60 tablet 0     Sig: Take 1 tablet (20 mg total) by mouth 2 (two) times daily. There is no refill protocol information for this order        amphetamine-dextroamphetamine (ADDERALL) 20 MG Oral Tab 60 tablet 0     Sig: Take 1 tablet (20 mg total) by mouth 2 (two) times daily.         There is no refill protocol information for this order          Recent Outpatient Visits              2 months ago Physical exam, annual    Rashad Pack MD    Office Visit    3 months ago Acute COVID-19    Aracely Umaña MD    [facility]    1 year ago Kenny Vargas MD    Office Visit    1 year ago [facility] Manav Jackson MD    [facility]    1 year ago Cervical radiculopathy    [facility] [place], [place]    Office Visit

## 2022-04-05 NOTE — TELEPHONE ENCOUNTER
Pt calling requesting refill for   amphetamine-dextroamphetamine (ADDERALL) 20 MG Oral Tab    Stating he is out of medication. Please advise.

## 2022-05-06 ENCOUNTER — TELEPHONE (OUTPATIENT)
Dept: INTERNAL MEDICINE CLINIC | Facility: CLINIC | Age: 22
End: 2022-05-06

## 2022-06-08 RX ORDER — DEXTROAMPHETAMINE SACCHARATE, AMPHETAMINE ASPARTATE, DEXTROAMPHETAMINE SULFATE AND AMPHETAMINE SULFATE 5; 5; 5; 5 MG/1; MG/1; MG/1; MG/1
20 TABLET ORAL 2 TIMES DAILY
Qty: 60 TABLET | Refills: 0 | Status: SHIPPED | OUTPATIENT
Start: 2022-08-09 | End: 2022-09-08

## 2022-06-08 RX ORDER — DEXTROAMPHETAMINE SACCHARATE, AMPHETAMINE ASPARTATE, DEXTROAMPHETAMINE SULFATE AND AMPHETAMINE SULFATE 5; 5; 5; 5 MG/1; MG/1; MG/1; MG/1
20 TABLET ORAL 2 TIMES DAILY
Qty: 60 TABLET | Refills: 0 | Status: SHIPPED | OUTPATIENT
Start: 2022-06-08 | End: 2022-07-08

## 2022-06-08 RX ORDER — DEXTROAMPHETAMINE SACCHARATE, AMPHETAMINE ASPARTATE, DEXTROAMPHETAMINE SULFATE AND AMPHETAMINE SULFATE 5; 5; 5; 5 MG/1; MG/1; MG/1; MG/1
20 TABLET ORAL 2 TIMES DAILY
Qty: 60 TABLET | Refills: 0 | Status: SHIPPED | OUTPATIENT
Start: 2022-07-09 | End: 2022-08-08

## 2022-06-08 NOTE — TELEPHONE ENCOUNTER
Please review; protocol failed/no protocol    90 day panel pended for June/July/August--see message below--patient out of medication    Requested Prescriptions   Pending Prescriptions Disp Refills    amphetamine-dextroamphetamine (ADDERALL) 20 MG Oral Tab 60 tablet 0     Sig: Take 1 tablet (20 mg total) by mouth 2 (two) times daily. There is no refill protocol information for this order        amphetamine-dextroamphetamine (ADDERALL) 20 MG Oral Tab 60 tablet 0     Sig: Take 1 tablet (20 mg total) by mouth 2 (two) times daily. There is no refill protocol information for this order        amphetamine-dextroamphetamine (ADDERALL) 20 MG Oral Tab 60 tablet 0     Sig: Take 1 tablet (20 mg total) by mouth 2 (two) times daily.         There is no refill protocol information for this order            Recent Outpatient Visits              4 months ago Physical exam, annual    Daya Ferrell MD    Office Visit    5 months ago Acute COVID-19    Vinny Mills MD    [facility]    1 year ago Piyush English MD    Office Visit    1 year ago [facility] Jefry Adorno MD    [facility]    1 year ago Cervical radiculopathy    [facility] [place], [place]    Office Visit

## 2022-07-29 ENCOUNTER — TELEPHONE (OUTPATIENT)
Dept: INTERNAL MEDICINE CLINIC | Facility: CLINIC | Age: 22
End: 2022-07-29

## 2022-07-29 NOTE — TELEPHONE ENCOUNTER
Verified name and . Patient states he is getting Alprazolam prescribed by his physician at [facility]. He states his doctor is on vacation. He states he is awaiting from on-call doctor at [facility] to call him back to get the prescription filled. He is asking if an on-call doctor at [facility] can fill the prescription. He was advised that since the medication is being prescribed by his physician at [facility], it is advised that he await the response from the on-call at [facility]. Patient verbalizes understanding and agrees with plan.

## 2022-09-09 ENCOUNTER — PATIENT MESSAGE (OUTPATIENT)
Dept: INTERNAL MEDICINE CLINIC | Facility: CLINIC | Age: 22
End: 2022-09-09

## 2022-09-09 RX ORDER — DEXTROAMPHETAMINE SACCHARATE, AMPHETAMINE ASPARTATE, DEXTROAMPHETAMINE SULFATE AND AMPHETAMINE SULFATE 5; 5; 5; 5 MG/1; MG/1; MG/1; MG/1
20 TABLET ORAL DAILY
Qty: 30 TABLET | Refills: 0 | OUTPATIENT
Start: 2022-11-10 | End: 2022-12-10

## 2022-09-09 RX ORDER — DEXTROAMPHETAMINE SACCHARATE, AMPHETAMINE ASPARTATE, DEXTROAMPHETAMINE SULFATE AND AMPHETAMINE SULFATE 5; 5; 5; 5 MG/1; MG/1; MG/1; MG/1
20 TABLET ORAL DAILY
Qty: 30 TABLET | Refills: 0 | OUTPATIENT
Start: 2022-10-10 | End: 2022-11-09

## 2022-09-09 RX ORDER — DEXTROAMPHETAMINE SACCHARATE, AMPHETAMINE ASPARTATE, DEXTROAMPHETAMINE SULFATE AND AMPHETAMINE SULFATE 5; 5; 5; 5 MG/1; MG/1; MG/1; MG/1
20 TABLET ORAL DAILY
Qty: 30 TABLET | Refills: 0 | OUTPATIENT
Start: 2022-09-09 | End: 2022-10-09

## 2022-09-09 NOTE — TELEPHONE ENCOUNTER
This patient needs to be seen every 6 months when on this medication. Call center: Please assist him to schedule an appointment as soon as possible. Nursing will discuss refill with the provider. Provider: Please advise on pended Adderall panel.

## 2022-09-09 NOTE — TELEPHONE ENCOUNTER
Patient is following up on the medication refill and request and notes he does not have any remaining and needs it for school. Patient mentions he is scheduled to see PCP on 9/14/22. Patient is requesting a call back. Please advise.

## 2022-09-09 NOTE — TELEPHONE ENCOUNTER
Patient is requesting a refill for amphetamine-dextroamphetamine (ADDERALL) 20 MG Oral Tab. Please advise. Patient is out of medication.

## 2022-09-09 NOTE — TELEPHONE ENCOUNTER
Appointment scheduled for 9/14.  Please advise on refill request Discharge instructions reviewed and signed with patient. No questions at this time. Patient ambulated out of ED with a steady gait.

## 2022-09-12 ENCOUNTER — TELEPHONE (OUTPATIENT)
Dept: INTERNAL MEDICINE CLINIC | Facility: CLINIC | Age: 22
End: 2022-09-12

## 2022-09-12 RX ORDER — DEXTROAMPHETAMINE SACCHARATE, AMPHETAMINE ASPARTATE, DEXTROAMPHETAMINE SULFATE AND AMPHETAMINE SULFATE 5; 5; 5; 5 MG/1; MG/1; MG/1; MG/1
20 TABLET ORAL 2 TIMES DAILY
Qty: 60 TABLET | Refills: 0 | Status: SHIPPED | OUTPATIENT
Start: 2022-09-12

## 2022-09-13 NOTE — TELEPHONE ENCOUNTER
Message # [id number]         2022 05:33p   [GURPREET]  To:  From:  RUI Haas MD:  Phone#:  ----------------------------------------------------------------------  Abdirashid Saint [phone] [facility] [address] GIULIANO ALEJANDRA CALLED OFFICE TODAY, RX STILL NOT AT PHARMACY, HAS BEEN OUT FOR DAYS, URGENT REFILL    Paged at number :  PAGE: [phone] at : [facility]- 17:33          (Message Delivered)   D E L I ALEX E R I E S :  2022 05:33p           [facility]

## 2022-09-13 NOTE — TELEPHONE ENCOUNTER
Rx sent to pts pharm on file, discussed with PCP prior to sending medication.  He has a follow up apt on weds 9/14

## 2022-09-21 ENCOUNTER — OFFICE VISIT (OUTPATIENT)
Dept: INTERNAL MEDICINE CLINIC | Facility: CLINIC | Age: 22
End: 2022-09-21

## 2022-09-21 VITALS
DIASTOLIC BLOOD PRESSURE: 70 MMHG | BODY MASS INDEX: 25.48 KG/M2 | SYSTOLIC BLOOD PRESSURE: 114 MMHG | HEIGHT: 71 IN | RESPIRATION RATE: 16 BRPM | WEIGHT: 182 LBS | HEART RATE: 71 BPM

## 2022-09-21 DIAGNOSIS — F41.9 ANXIETY: ICD-10-CM

## 2022-09-21 DIAGNOSIS — U09.9 POST-COVID CHRONIC NEUROLOGIC SYMPTOMS: ICD-10-CM

## 2022-09-21 DIAGNOSIS — D71 CHRONIC GRANULOMATOUS DISEASE (HCC): Primary | ICD-10-CM

## 2022-09-21 DIAGNOSIS — F90.2 ATTENTION DEFICIT HYPERACTIVITY DISORDER (ADHD), COMBINED TYPE: ICD-10-CM

## 2022-09-21 DIAGNOSIS — R29.90 POST-COVID CHRONIC NEUROLOGIC SYMPTOMS: ICD-10-CM

## 2022-09-21 PROCEDURE — 3008F BODY MASS INDEX DOCD: CPT | Performed by: INTERNAL MEDICINE

## 2022-09-21 PROCEDURE — 3078F DIAST BP <80 MM HG: CPT | Performed by: INTERNAL MEDICINE

## 2022-09-21 PROCEDURE — 99214 OFFICE O/P EST MOD 30 MIN: CPT | Performed by: INTERNAL MEDICINE

## 2022-09-21 PROCEDURE — 3074F SYST BP LT 130 MM HG: CPT | Performed by: INTERNAL MEDICINE

## 2022-09-21 RX ORDER — DEXTROAMPHETAMINE SACCHARATE, AMPHETAMINE ASPARTATE, DEXTROAMPHETAMINE SULFATE AND AMPHETAMINE SULFATE 5; 5; 5; 5 MG/1; MG/1; MG/1; MG/1
20 TABLET ORAL 2 TIMES DAILY
Qty: 60 TABLET | Refills: 0 | Status: SHIPPED | OUTPATIENT
Start: 2022-11-22 | End: 2022-12-22

## 2022-09-21 RX ORDER — DEXTROAMPHETAMINE SACCHARATE, AMPHETAMINE ASPARTATE, DEXTROAMPHETAMINE SULFATE AND AMPHETAMINE SULFATE 5; 5; 5; 5 MG/1; MG/1; MG/1; MG/1
20 TABLET ORAL 2 TIMES DAILY
Qty: 60 TABLET | Refills: 0 | Status: SHIPPED | OUTPATIENT
Start: 2022-10-22 | End: 2022-11-21

## 2022-09-21 RX ORDER — DEXTROAMPHETAMINE SACCHARATE, AMPHETAMINE ASPARTATE, DEXTROAMPHETAMINE SULFATE AND AMPHETAMINE SULFATE 5; 5; 5; 5 MG/1; MG/1; MG/1; MG/1
20 TABLET ORAL 2 TIMES DAILY
Qty: 60 TABLET | Refills: 0 | Status: SHIPPED | OUTPATIENT
Start: 2022-09-21 | End: 2022-10-21

## 2023-01-17 RX ORDER — DEXTROAMPHETAMINE SACCHARATE, AMPHETAMINE ASPARTATE, DEXTROAMPHETAMINE SULFATE AND AMPHETAMINE SULFATE 5; 5; 5; 5 MG/1; MG/1; MG/1; MG/1
20 TABLET ORAL 2 TIMES DAILY
Qty: 60 TABLET | Refills: 0 | Status: SHIPPED | OUTPATIENT
Start: 2023-01-17

## 2023-02-22 RX ORDER — DEXTROAMPHETAMINE SACCHARATE, AMPHETAMINE ASPARTATE, DEXTROAMPHETAMINE SULFATE AND AMPHETAMINE SULFATE 5; 5; 5; 5 MG/1; MG/1; MG/1; MG/1
20 TABLET ORAL 2 TIMES DAILY
Qty: 60 TABLET | Refills: 0 | Status: SHIPPED | OUTPATIENT
Start: 2023-02-22

## 2023-03-22 RX ORDER — DEXTROAMPHETAMINE SACCHARATE, AMPHETAMINE ASPARTATE, DEXTROAMPHETAMINE SULFATE AND AMPHETAMINE SULFATE 5; 5; 5; 5 MG/1; MG/1; MG/1; MG/1
20 TABLET ORAL 2 TIMES DAILY
Qty: 60 TABLET | Refills: 0 | Status: SHIPPED | OUTPATIENT
Start: 2023-03-22 | End: 2023-04-21

## 2023-03-22 RX ORDER — DEXTROAMPHETAMINE SACCHARATE, AMPHETAMINE ASPARTATE, DEXTROAMPHETAMINE SULFATE AND AMPHETAMINE SULFATE 5; 5; 5; 5 MG/1; MG/1; MG/1; MG/1
20 TABLET ORAL 2 TIMES DAILY
Qty: 60 TABLET | Refills: 0 | Status: SHIPPED | OUTPATIENT
Start: 2023-05-23 | End: 2023-06-22

## 2023-03-22 RX ORDER — DEXTROAMPHETAMINE SACCHARATE, AMPHETAMINE ASPARTATE, DEXTROAMPHETAMINE SULFATE AND AMPHETAMINE SULFATE 5; 5; 5; 5 MG/1; MG/1; MG/1; MG/1
20 TABLET ORAL 2 TIMES DAILY
Qty: 60 TABLET | Refills: 0 | Status: SHIPPED | OUTPATIENT
Start: 2023-04-22 | End: 2023-05-22

## 2023-03-22 NOTE — TELEPHONE ENCOUNTER
Pt requesting Rx:    Adderall 20mg 2 pills daily    Send to:    [id number] [place], [place] - [address] [facility], [phone], [address] [place] [address]   Phone: [phone] Fax: [phone]       *Pt completely out of medication.

## 2023-06-08 RX ORDER — DEXTROAMPHETAMINE SACCHARATE, AMPHETAMINE ASPARTATE, DEXTROAMPHETAMINE SULFATE AND AMPHETAMINE SULFATE 5; 5; 5; 5 MG/1; MG/1; MG/1; MG/1
20 TABLET ORAL 2 TIMES DAILY
Qty: 60 TABLET | Refills: 0 | Status: SHIPPED | OUTPATIENT
Start: 2023-06-08 | End: 2023-07-08

## 2023-06-08 NOTE — TELEPHONE ENCOUNTER
Patient needs script sent to different pharmacy, currently pharmacy does not have it    Please send to [facility] in [address], [address]

## 2023-06-08 NOTE — TELEPHONE ENCOUNTER
RN called [facility], spoke with Howie, confirmed that March and April 2023 Adderall prescriptions were picked up by the patient BUT not the June (dated 5/23/23 prescription). RN requested to cancel the 5/23/23 prescription (this is for June 2023 ), patient requested to send it to different  [facility] location. Medication record updated. Per medication record, Adderall 90 Day panel was sent on 3/22/23 . RN pended only 1 prescription for June (part of the 90 day panel ). Preferred Pharmacy updated.

## 2023-06-10 ENCOUNTER — TELEPHONE (OUTPATIENT)
Dept: INTERNAL MEDICINE CLINIC | Facility: CLINIC | Age: 23
End: 2023-06-10

## 2023-06-10 RX ORDER — DEXTROAMPHETAMINE SACCHARATE, AMPHETAMINE ASPARTATE, DEXTROAMPHETAMINE SULFATE AND AMPHETAMINE SULFATE 5; 5; 5; 5 MG/1; MG/1; MG/1; MG/1
20 TABLET ORAL 2 TIMES DAILY
Qty: 60 TABLET | Refills: 0 | Status: SHIPPED | OUTPATIENT
Start: 2023-06-10

## 2023-06-10 NOTE — TELEPHONE ENCOUNTER
Pt came to ask if possible to print script for amphentamine dextroamphetamine 20 mg oral due to pharmacy currently out, will need to see who has the medication to and drop off prescription

## 2023-07-20 RX ORDER — DEXTROAMPHETAMINE SACCHARATE, AMPHETAMINE ASPARTATE, DEXTROAMPHETAMINE SULFATE AND AMPHETAMINE SULFATE 5; 5; 5; 5 MG/1; MG/1; MG/1; MG/1
20 TABLET ORAL 2 TIMES DAILY
Qty: 60 TABLET | Refills: 0 | Status: SHIPPED | OUTPATIENT
Start: 2023-07-20

## 2023-07-20 NOTE — TELEPHONE ENCOUNTER
Patient requesting refill of:  amphetamine-dextroamphetamine 20 MG Oral Tab   He is out of medication     Please send to:  [id number]- [place], [place] - [address]

## 2023-07-21 NOTE — TELEPHONE ENCOUNTER
refilled meds , pls ask pt to schedule his apt now so that he will not become overdue for the apt and in turn delay further refills in the near future

## 2023-09-06 ENCOUNTER — TELEPHONE (OUTPATIENT)
Dept: INTERNAL MEDICINE CLINIC | Facility: CLINIC | Age: 23
End: 2023-09-06

## 2023-09-06 NOTE — TELEPHONE ENCOUNTER
Patient calling for script refill for rx amphetamine, [facility] Please update patient at [phone],[id number].

## 2023-09-06 NOTE — TELEPHONE ENCOUNTER
Az Chandler MD         7/20/23 10:08 PM  Note  refilled meds , pls ask pt to schedule his apt now so that he will not become overdue for the apt and in turn delay further refills in the near future         Patient has not returned calls nor read Handshake message from July. CSS- please try to contact patient again for appointment and route back to Triage once made.

## 2023-10-19 ENCOUNTER — TELEPHONE (OUTPATIENT)
Dept: ADMINISTRATIVE | Age: 23
End: 2023-10-19

## 2024-02-12 NOTE — TELEPHONE ENCOUNTER
Pt is requesting refill. Per pt, he is out of medication.     amphetamine-dextroamphetamine (ADDERALL) 20 MG Oral Tab         [id number] [place] [place] - [address] AT [facility], [phone], [phone]

## 2024-02-15 RX ORDER — DEXTROAMPHETAMINE SACCHARATE, AMPHETAMINE ASPARTATE, DEXTROAMPHETAMINE SULFATE AND AMPHETAMINE SULFATE 5; 5; 5; 5 MG/1; MG/1; MG/1; MG/1
20 TABLET ORAL 2 TIMES DAILY
Qty: 60 TABLET | Refills: 0 | OUTPATIENT
Start: 2024-03-14 | End: 2024-04-13

## 2024-02-15 RX ORDER — DEXTROAMPHETAMINE SACCHARATE, AMPHETAMINE ASPARTATE, DEXTROAMPHETAMINE SULFATE AND AMPHETAMINE SULFATE 5; 5; 5; 5 MG/1; MG/1; MG/1; MG/1
20 TABLET ORAL 2 TIMES DAILY
Qty: 60 TABLET | Refills: 0 | OUTPATIENT
Start: 2024-04-14 | End: 2024-05-14

## 2024-02-15 RX ORDER — DEXTROAMPHETAMINE SACCHARATE, AMPHETAMINE ASPARTATE, DEXTROAMPHETAMINE SULFATE AND AMPHETAMINE SULFATE 5; 5; 5; 5 MG/1; MG/1; MG/1; MG/1
20 TABLET ORAL 2 TIMES DAILY
Qty: 60 TABLET | Refills: 0 | OUTPATIENT
Start: 2024-02-15 | End: 2024-03-16

## 2024-02-15 NOTE — TELEPHONE ENCOUNTER
[facility] message sent to patient to schedule an office visit with PCP.   Please make a phone attempt.

## 2024-02-15 NOTE — TELEPHONE ENCOUNTER
Please ask patient to make a follow up, in person preferred as he has not seen PCP in person since 2022

## 2024-06-25 DIAGNOSIS — F90.2 ATTENTION DEFICIT HYPERACTIVITY DISORDER (ADHD), COMBINED TYPE: ICD-10-CM

## 2024-06-25 RX ORDER — DEXTROAMPHETAMINE SACCHARATE, AMPHETAMINE ASPARTATE, DEXTROAMPHETAMINE SULFATE AND AMPHETAMINE SULFATE 5; 5; 5; 5 MG/1; MG/1; MG/1; MG/1
20 TABLET ORAL 2 TIMES DAILY
Qty: 60 TABLET | Refills: 0 | Status: SHIPPED | OUTPATIENT
Start: 2024-06-25 | End: 2024-07-25

## 2024-06-25 NOTE — TELEPHONE ENCOUNTER
Please review; protocol failed/No Protocol    Recent Fills: 02/21/2024, 04/24/2024    Last Rx Written: 02/21/2024    Last Office Visit: 02/21/2024    Requested Prescriptions   Pending Prescriptions Disp Refills    amphetamine-dextroamphetamine (ADDERALL) 20 MG Oral Tab 60 tablet 0     Sig: Take 1 tablet (20 mg total) by mouth 2 (two) times daily.       Controlled Substance Medication Failed - 6/25/2024  8:58 AM        Failed - This medication is a controlled substance - forward to provider to refill             Recent Outpatient Visits              4 months ago Attention deficit hyperactivity disorder (ADHD), combined type    [facility]    Office Visit    9 months ago Attention deficit hyperactivity disorder (ADHD), combined type    [facility], Karma Anna APRN    Telemedicine    1 year ago Chronic granulomatous disease (HCC)    [facility]    Office Visit    2 years ago Physical exam, annual    [facility]    [facility] Visit    2 years ago Acute COVID-19    [facility] Main [facility]    Virtual Phone E/M

## 2024-06-25 NOTE — TELEPHONE ENCOUNTER
Patient called requesting a refill on the following medication:    amphetamine-dextroamphetamine (ADDERALL) 20 MG Oral Tab     Per patient he is completely out of medication.

## 2024-08-19 DIAGNOSIS — F90.2 ATTENTION DEFICIT HYPERACTIVITY DISORDER (ADHD), COMBINED TYPE: ICD-10-CM

## 2024-08-19 RX ORDER — DEXTROAMPHETAMINE SACCHARATE, AMPHETAMINE ASPARTATE, DEXTROAMPHETAMINE SULFATE AND AMPHETAMINE SULFATE 5; 5; 5; 5 MG/1; MG/1; MG/1; MG/1
20 TABLET ORAL 2 TIMES DAILY
Qty: 60 TABLET | Refills: 0 | Status: SHIPPED | OUTPATIENT
Start: 2024-08-19 | End: 2024-09-18

## 2024-08-19 NOTE — TELEPHONE ENCOUNTER
Patient calling to request refill, stated is completely out, [facility] [place]    amphetamine-dextroamphetamine (ADDERALL) 20 MG Oral Tab

## 2024-08-19 NOTE — TELEPHONE ENCOUNTER
Please review. Protocol Failed; No Protocol      Recent fills: 4/23/2024, 7/4/2024  Last Rx written: 6/25/2024  Last office visit: 2/21/2024        Requested Prescriptions   Pending Prescriptions Disp Refills    amphetamine-dextroamphetamine (ADDERALL) 20 MG Oral Tab 60 tablet 0     Sig: Take 1 tablet (20 mg total) by mouth 2 (two) times daily.       Controlled Substance Medication Failed - 8/19/2024  2:04 PM        Failed - This medication is a controlled substance - forward to provider to refill                 Recent Outpatient Visits              6 months ago Attention deficit hyperactivity disorder (ADHD), combined type    [facility] [place]    Office Visit    11 months ago Attention deficit hyperactivity disorder (ADHD), combined type    [facility], [facility], [place], [place]    Telemedicine    1 year ago [facility]    [facility] Visit    2 years ago [facility], [facility]    [facility]    2 years ago [facility]    Virtual Phone E/M

## 2024-12-05 DIAGNOSIS — F90.2 ATTENTION DEFICIT HYPERACTIVITY DISORDER (ADHD), COMBINED TYPE: ICD-10-CM

## 2024-12-05 RX ORDER — DEXTROAMPHETAMINE SACCHARATE, AMPHETAMINE ASPARTATE, DEXTROAMPHETAMINE SULFATE AND AMPHETAMINE SULFATE 5; 5; 5; 5 MG/1; MG/1; MG/1; MG/1
20 TABLET ORAL 2 TIMES DAILY
Qty: 60 TABLET | Refills: 0 | Status: SHIPPED | OUTPATIENT
Start: 2024-12-05 | End: 2025-01-04

## 2024-12-05 NOTE — TELEPHONE ENCOUNTER
Routing High Priority as patient is out of medication, please advise     Requested Prescriptions     Pending Prescriptions Disp Refills    amphetamine-dextroamphetamine (ADDERALL) 20 MG Oral Tab 60 tablet 0     Sig: Take 1 tablet (20 mg total) by mouth 2 (two) times daily.         Reason for Medication Refill being sent to Provider / Reason Protocol Failed:  [x] Controlled Substance       Quantity: 60  Last Time Medication was Filled:  10/22/2024, 08/19/2024, 07/04/2024  Last Time Medication was Written : 10/22/2024      Last Office Visit : 02/21/2024

## 2024-12-05 NOTE — TELEPHONE ENCOUNTER
Patient requesting refill, completely out     [id number] - [place], [place] - [address] AT [facility],        Medication Detail    Medication Quantity Refills Start End   amphetamine-dextroamphetamine (ADDERALL) 20 MG Oral Tab () 60 tablet 0 10/22/2024 2024   Sig:   Take 1 tablet (20 mg total) by mouth 2 (two) times daily.     Route:   Oral     Earliest Fill Date:   10/22/2024     Order #:   [id number]

## 2025-08-25 DIAGNOSIS — F90.2 ATTENTION DEFICIT HYPERACTIVITY DISORDER (ADHD), COMBINED TYPE: ICD-10-CM

## 2025-08-25 RX ORDER — DEXTROAMPHETAMINE SACCHARATE, AMPHETAMINE ASPARTATE, DEXTROAMPHETAMINE SULFATE AND AMPHETAMINE SULFATE 5; 5; 5; 5 MG/1; MG/1; MG/1; MG/1
20 TABLET ORAL 2 TIMES DAILY
Qty: 60 TABLET | Refills: 0 | OUTPATIENT
Start: 2025-08-25 | End: 2025-09-24

## (undated) NOTE — ED AVS SNAPSHOT
Dominick Donovans   MRN: [id number]    Department:  [facility] Emergency Department   Date of Visit:  12/26/2018           Disclosure     Insurance plans vary and the physician(s) referred by the ER may not be covered by your plan.  Please contact y CARE PHYSICIAN AT ONCE OR RETURN IMMEDIATELY TO THE EMERGENCY DEPARTMENT. If you have been prescribed any medication(s), please fill your prescription right away and begin taking the medication(s) as directed.   If you believe that any of the medications

## (undated) NOTE — Clinical Note
1/27/2017              [facility]        [address] [address]         To Whom It May Concern,    Valeriy Viverosold for missed school and work today due to illness.      Sincerely,      Salima Hodge MD  [facility]

## (undated) NOTE — MR AVS SNAPSHOT
[address]  [address]  [phone]               Thank you for choosing us for your health care visit with Matilde Sauceda MD.  We are glad to serve you and happy to provide you with this summa · Honey has been shown to be the most helpful cough suppressant - better than OTC cough medications like Delsym. OTC cough medications can contain many different ingredients and are best avoided. But only use honey for children > 1 yr of age.  There is an O predniSONE 10 MG Tabs   GIVE 4 TABLETS (40 MG TOTAL) BY MOUTH DAILY. Commonly known as:  DELTASONE           SPORANOX 100 MG Caps   Generic drug:  itraconazole   Take  by mouth.            Sulfamethoxazole-TMP -160 MG per tablet   Take 1 tablet by

## (undated) NOTE — LETTER
11/11/2019          To Whom It May Concern:    Heather Maria was seen in the office today and will return back to school on 11/14/19 and per orthopedic recommendations. If you require additional information please contact our office.         Sincerely,    Em

## (undated) NOTE — LETTER
2/1/2021          To Whom It May Concern:    Alaina Miranda is currently under my medical care and may  return to work  On 2/1/2021. Activity is restricted as follows: none. If you require additional information please contact our office.         Marianna Story

## (undated) NOTE — LETTER
2/1/2021          To Whom It May Concern:    Brissa Galvez is currently under my medical care and may return to work at this time. He may return to work on 2/1/2021  . Activity is restricted as follows: none.     If you require additional information pl

## (undated) NOTE — LETTER
9/3/2019           To Whom It May Concern:    Del Carlson is currently under my medical care and may not return to work and school at this time. Please excuse Kacie Garg for 1 days. He may return to work on 9/4/19.   Activity is restricted as follows: n

## (undated) NOTE — LETTER
7/10/2018          To Whom It May Concern:    Charmaine Salcedo is currently under my medical care and may not return to work at this time. If you require additional information please contact our office.         Sincerely,    Tacos Ma MD          Docu

## (undated) NOTE — LETTER
11/5/2019          To Whom It May Concern:    Kathleen Powell is currently under my medical care and may not return to work at this time. Please excuse Polly Marie for 1 days. He may return to work on 11/6/2019. Activity is restricted as follows: none.

## (undated) NOTE — LETTER
3/7/2018          To Whom It May Concern:    Jesse Torres is currently under my medical care was seen and evaulated today . He may return to work today without restriction . Activity is restricted as follows: none.      If you require additional information

## (undated) NOTE — LETTER
1/2/2019          To Whom It May Concern:    Jeferson Sandra is currently under my medical care and may return to work at this time. Please excuse Olena Myles for being late to work today  He  may return to work  on 1/2/19.   Activity is restricted as follows

## (undated) NOTE — LETTER
11/11/2019          To Whom It May Concern:    Charmaine Salcedo was seen in the office today and will return back to work on 11/14/19 or per orthopedic recommendation. If you require additional information please contact our office.         Sincerely,    Kai

## (undated) NOTE — LETTER
[address]  [facility]  Dept: [phone]  Dept Fax: [phone]: [phone]      December 3, 2017    Patient: Karen Loredo   Date of Visit: 12/3/2017       To Whom It May Concern:    Ang

## (undated) NOTE — ED AVS SNAPSHOT
Leland Marquez   MRN: [id number]    Department:  [facility] Emergency Department   Date of Visit:  6/30/2018           Disclosure     Insurance plans vary and the physician(s) referred by the ER may not be covered by your plan.  Please contact yo CARE PHYSICIAN AT ONCE OR RETURN IMMEDIATELY TO THE EMERGENCY DEPARTMENT. If you have been prescribed any medication(s), please fill your prescription right away and begin taking the medication(s) as directed.   If you believe that any of the medications

## (undated) NOTE — LETTER
9/11/2019          To Whom It May Concern:    Rosalia Modi is currently under my medical care and may not return to work at this time until 3pm .    Please excuse Addison Millan for 1 days. He may return to work on 9/11/19.   Activity is restricted as follows:

## (undated) NOTE — LETTER
7/10/2018          To Whom It May Concern:    Leland Marquez is currently under my medical care and may return to work on Wednesday 07/11/2018. Activity is restricted as follows: none. If you require additional information please contact our office.

## (undated) NOTE — ED AVS SNAPSHOT
Genaro Viramontes   MRN: [id number]    Department:  [facility] Emergency Department   Date of Visit:  6/24/2018           Disclosure     Insurance plans vary and the physician(s) referred by the ER may not be covered by your plan.  Please contact yo CARE PHYSICIAN AT ONCE OR RETURN IMMEDIATELY TO THE EMERGENCY DEPARTMENT. If you have been prescribed any medication(s), please fill your prescription right away and begin taking the medication(s) as directed.   If you believe that any of the medications

## (undated) NOTE — LETTER
Date & Time: 6/25/2018, 5:07 PM  Patient: Mimi Keenan  Encounter Provider(s):    Dennys Lyn MD       To Whom It May Concern:    Mimi Keenan was seen and treated in our department on 6/24/2018. He should not return to work until 6/26/18.     If

## (undated) NOTE — LETTER
12/20/2021              [facility]        [address] [facility]         To Whom It May Concern,     [facility] developed symptoms of acute Covid infection on December 18 and subsequently tested positive with a home Cov

## (undated) NOTE — LETTER
11/11/2019          To Whom It May Concern:    Xavi Roger was seen in the office today and will return back to work on 11/14/19 and per orthopedic recommendation. If you require additional information please contact our office.         Sincerely,    Kai

## (undated) NOTE — LETTER
6/27/2018              [facility]        [address] [facility] [address]         Dear Mallory Corea,      Sincerely,    Evan Holloway MD  [address] [place] [phone]  [phone]

## (undated) NOTE — LETTER
9/11/2019          To Whom It May Concern:    Dominick Foreman is currently under my medical care and may not return to school at this time. Please excuse Alesha Simon for 1 day. He may return to school on 9/12/19. Activity is restricted as follows: none.

## (undated) NOTE — LETTER
20          Heather Maria  :  2000      To Whom It May Concern: This patient was seen in our office on 20 . Work status:   May return to work full-time with restrictions No heavy lifting greater than 20 pounds starting 2021 unt